# Patient Record
Sex: FEMALE | Race: WHITE | NOT HISPANIC OR LATINO | Employment: OTHER | URBAN - METROPOLITAN AREA
[De-identification: names, ages, dates, MRNs, and addresses within clinical notes are randomized per-mention and may not be internally consistent; named-entity substitution may affect disease eponyms.]

---

## 2017-01-16 ENCOUNTER — TELEPHONE (OUTPATIENT)
Dept: OBSTETRICS AND GYNECOLOGY | Facility: CLINIC | Age: 62
End: 2017-01-16

## 2017-01-16 DIAGNOSIS — Z12.31 VISIT FOR SCREENING MAMMOGRAM: Primary | ICD-10-CM

## 2017-01-16 NOTE — TELEPHONE ENCOUNTER
Called pt concerning follow-up imaging evaluation at [facility]. Instructed pt to call [facility] to schedule appt per Dr Zee. Pt voiced understanding.

## 2017-07-20 ENCOUNTER — TELEPHONE (OUTPATIENT)
Dept: OBSTETRICS AND GYNECOLOGY | Facility: CLINIC | Age: 62
End: 2017-07-20

## 2018-03-28 ENCOUNTER — TELEPHONE (OUTPATIENT)
Dept: OBSTETRICS AND GYNECOLOGY | Facility: CLINIC | Age: 63
End: 2018-03-28

## 2018-03-28 NOTE — TELEPHONE ENCOUNTER
----- Message from Nia Simeon sent at 3/28/2018 10:24 AM CDT -----  Contact: PT  x_  1st Request  _  2nd Request  _  3rd Request      Who:pt    Why: pt states that she is still waiting on a call back    What Number to Call Back: [phone]    When to Expect a call back: (Before the end of the day)   -- if call after 3:00 call back will be tomorrow.

## 2018-05-15 ENCOUNTER — OFFICE VISIT (OUTPATIENT)
Dept: OBSTETRICS AND GYNECOLOGY | Facility: CLINIC | Age: 63
End: 2018-05-15
Attending: OBSTETRICS & GYNECOLOGY
Payer: COMMERCIAL

## 2018-05-15 VITALS
SYSTOLIC BLOOD PRESSURE: 144 MMHG | WEIGHT: 177.5 LBS | BODY MASS INDEX: 27.86 KG/M2 | DIASTOLIC BLOOD PRESSURE: 98 MMHG | HEIGHT: 67 IN

## 2018-05-15 DIAGNOSIS — Z01.419 WELL WOMAN EXAM WITH ROUTINE GYNECOLOGICAL EXAM: Primary | ICD-10-CM

## 2018-05-15 DIAGNOSIS — N39.0 FREQUENT UTI: ICD-10-CM

## 2018-05-15 DIAGNOSIS — Z01.419 PAP SMEAR, AS PART OF ROUTINE GYNECOLOGICAL EXAMINATION: ICD-10-CM

## 2018-05-15 DIAGNOSIS — N95.2 VAGINAL ATROPHY: ICD-10-CM

## 2018-05-15 DIAGNOSIS — Z78.0 MENOPAUSE: ICD-10-CM

## 2018-05-15 PROCEDURE — 99396 PREV VISIT EST AGE 40-64: CPT | Mod: S$GLB,,, | Performed by: OBSTETRICS & GYNECOLOGY

## 2018-05-15 PROCEDURE — 88175 CYTOPATH C/V AUTO FLUID REDO: CPT

## 2018-05-15 NOTE — PROGRESS NOTES
Subjective:       Patient ID: Lucy Booth is a 62 y.o. female.    Chief Complaint:  Well Woman      History of Present Illness  HPI  This 62 yr old P3 female is here for routine exam.  She is new to me but saw Dr Zee in the past.  She is not taking ERT Last pap  and we have no pap on file.  Her only complaint is frequent UTIs.  She had some sort of sling/repair done with her hyst and she still had ovaries.  No leaking just frequent UTIs  We discussed atrophy and will start on intrarosa and see if this helps.  She is not sexually active.    GYN & OB History  No LMP recorded. Patient has had a hysterectomy.   Date of Last Pap: No result found    OB History    Para Term  AB Living   3 3       3   SAB TAB Ectopic Multiple Live Births           3      # Outcome Date GA Lbr Lit/2nd Weight Sex Delivery Anes PTL Lv   3 Para      Vag-Spont   STEVEN   2 Para      Vag-Spont   STEVEN   1 Para      Vag-Spont   STEVEN          Review of Systems  Review of Systems   Constitutional: Negative for chills and fever.   Respiratory: Negative for shortness of breath.    Cardiovascular: Negative for chest pain.   Gastrointestinal: Negative for abdominal pain, nausea and vomiting.   Genitourinary: Negative for difficulty urinating, dyspareunia, genital sores, menstrual problem, pelvic pain, vaginal bleeding, vaginal discharge and vaginal pain.   Skin: Negative for wound.   Hematological: Negative for adenopathy.           Objective:    Physical Exam:   Constitutional: She is oriented to person, place, and time. She appears well-developed and well-nourished.    HENT:   Head: Normocephalic.    Eyes: EOM are normal.    Neck: Normal range of motion.    Cardiovascular: Normal rate.     Pulmonary/Chest: Effort normal. She exhibits no mass and no tenderness. Right breast exhibits no inverted nipple, no mass, no skin change and no tenderness. Left breast exhibits no inverted nipple, no mass, no skin change and no tenderness.         Abdominal: Soft. She exhibits no distension. There is no tenderness.     Genitourinary: Vagina normal. There is no rash, tenderness or lesion on the right labia. There is no rash, tenderness or lesion on the left labia. Uterus is absent. Uterus is not tender. Cervix is normal. Right adnexum displays no mass, no tenderness and no fullness. Left adnexum displays no mass, no tenderness and no fullness. Cervix exhibits no discharge.   Genitourinary Comments: Vaginal atrophy           Musculoskeletal: Normal range of motion.       Neurological: She is alert and oriented to person, place, and time.    Skin: Skin is warm and dry.    Psychiatric: She has a normal mood and affect.          Assessment:        1. Well woman exam with routine gynecological exam    2. Menopause    3. Vaginal atrophy    4. Frequent UTI               Plan:      Start on intrarosa  Pap today and in 5 yrs  Mammogram yearly

## 2018-08-24 ENCOUNTER — TELEPHONE (OUTPATIENT)
Dept: OBSTETRICS AND GYNECOLOGY | Facility: CLINIC | Age: 63
End: 2018-08-24

## 2018-08-24 NOTE — TELEPHONE ENCOUNTER
----- Message from Yamil Hightower MA sent at 8/22/2018  1:28 PM CDT -----  Patient is requesting mmg orders be sent to [facility]......Please contact to further discuss and advise       Can the clinic reply by [facility]: No     What Number to Call Back if not in [facility]: [phone]

## 2019-06-25 ENCOUNTER — TELEPHONE (OUTPATIENT)
Dept: OBSTETRICS AND GYNECOLOGY | Facility: CLINIC | Age: 64
End: 2019-06-25

## 2019-06-25 NOTE — TELEPHONE ENCOUNTER
----- Message from Sofia Naylor sent at 6/25/2019 11:54 AM CDT -----  Contact: EVELIO BAUM [[id number]]  Name of Who is Calling: EVELIO BAUM [[id number]]      What is the request in detail: patient would like to schedule WWE. Please call to schedule     Can the clinic reply by [facility]: no    What Number to Call Back if not in [facility]: [phone]

## 2019-08-15 ENCOUNTER — OFFICE VISIT (OUTPATIENT)
Dept: OBSTETRICS AND GYNECOLOGY | Facility: CLINIC | Age: 64
End: 2019-08-15
Attending: OBSTETRICS & GYNECOLOGY
Payer: COMMERCIAL

## 2019-08-15 VITALS
HEIGHT: 67 IN | WEIGHT: 181.44 LBS | SYSTOLIC BLOOD PRESSURE: 124 MMHG | BODY MASS INDEX: 28.48 KG/M2 | DIASTOLIC BLOOD PRESSURE: 78 MMHG

## 2019-08-15 DIAGNOSIS — Z12.31 SCREENING MAMMOGRAM, ENCOUNTER FOR: ICD-10-CM

## 2019-08-15 DIAGNOSIS — Z01.419 WELL WOMAN EXAM WITH ROUTINE GYNECOLOGICAL EXAM: Primary | ICD-10-CM

## 2019-08-15 DIAGNOSIS — Z13.820 SCREENING FOR OSTEOPOROSIS: ICD-10-CM

## 2019-08-15 DIAGNOSIS — Z12.4 PAP SMEAR FOR CERVICAL CANCER SCREENING: ICD-10-CM

## 2019-08-15 PROCEDURE — 99396 PR PREVENTIVE VISIT,EST,40-64: ICD-10-PCS | Mod: S$GLB,,, | Performed by: OBSTETRICS & GYNECOLOGY

## 2019-08-15 PROCEDURE — 87624 HPV HI-RISK TYP POOLED RSLT: CPT

## 2019-08-15 PROCEDURE — 88175 CYTOPATH C/V AUTO FLUID REDO: CPT

## 2019-08-15 PROCEDURE — 99396 PREV VISIT EST AGE 40-64: CPT | Mod: S$GLB,,, | Performed by: OBSTETRICS & GYNECOLOGY

## 2019-08-15 NOTE — PROGRESS NOTES
Subjective:       Patient ID: Lucy Booth is a 64 y.o. female.    Chief Complaint:  Well Woman      History of Present Illness  HPI  This 64 yr old P3 female is here for routine exam and has no complaints.  She is not on HRT but we started intrarosa last yr for freq UTIs but she has stopped taking.  She is due for mammogram and has done [facility].  She had her hyst for abnormal paps so will get another on chart with HPV today before skipping.    GYN & OB History  No LMP recorded. Patient has had a hysterectomy.   Date of Last Pap: 2018    OB History    Para Term  AB Living   3 3       3   SAB TAB Ectopic Multiple Live Births           3      # Outcome Date GA Lbr Lit/2nd Weight Sex Delivery Anes PTL Lv   3 Para      Vag-Spont   STEVEN   2 Para      Vag-Spont   STEVEN   1 Para      Vag-Spont   STEVEN       Review of Systems  Review of Systems   Constitutional: Negative for chills and fever.   Respiratory: Negative for shortness of breath.    Cardiovascular: Negative for chest pain.   Gastrointestinal: Negative for abdominal pain, nausea and vomiting.   Genitourinary: Negative for difficulty urinating, dyspareunia, genital sores, menstrual problem, pelvic pain, vaginal bleeding, vaginal discharge and vaginal pain.   Skin: Negative for wound.   Hematological: Negative for adenopathy.           Objective:   Physical Exam:   Constitutional: She is oriented to person, place, and time. She appears well-developed and well-nourished.    HENT:   Head: Normocephalic.    Eyes: EOM are normal.    Neck: Normal range of motion.    Cardiovascular: Normal rate.     Pulmonary/Chest: Effort normal. She exhibits no mass and no tenderness. Right breast exhibits no inverted nipple, no mass, no skin change and no tenderness. Left breast exhibits no inverted nipple, no mass, no skin change and no tenderness.        Abdominal: Soft. She exhibits no distension. There is no tenderness.     Genitourinary: Vagina normal. There is  no rash, tenderness or lesion on the right labia. There is no rash, tenderness or lesion on the left labia. Uterus is absent. Uterus is not tender. Cervix is normal. Right adnexum displays no mass, no tenderness and no fullness. Left adnexum displays no mass, no tenderness and no fullness. Cervix exhibits no discharge.           Musculoskeletal: Normal range of motion.       Neurological: She is alert and oriented to person, place, and time.    Skin: Skin is warm and dry.    Psychiatric: She has a normal mood and affect.     She does not have a cervix     Assessment:        1. Well woman exam with routine gynecological exam    2. Screening mammogram, encounter for    3. Pap smear for cervical cancer screening    4. Screening for osteoporosis               Plan:      Mammogram yearly  DEXA scan  She may have refil on intrarosa if she wants  Pap with HPV today then move to several yrs.

## 2019-08-21 LAB
HPV HR 12 DNA CVX QL NAA+PROBE: NEGATIVE
HPV16 AG SPEC QL: NEGATIVE
HPV18 DNA SPEC QL NAA+PROBE: NEGATIVE

## 2019-10-07 ENCOUNTER — TELEPHONE (OUTPATIENT)
Dept: OBSTETRICS AND GYNECOLOGY | Facility: CLINIC | Age: 64
End: 2019-10-07

## 2019-10-07 NOTE — TELEPHONE ENCOUNTER
----- Message from Jose Carlos Rowell sent at 10/7/2019 12:29 PM CDT -----  Contact: EVELIO BAUM [[id number]]  Name of Who is Calling: EVELIO BAUM [[id number]]    What is the request in detail:patient is requesting a call back in regards to scheduling appointment ....     Please contact to further discuss and advise      Can the clinic reply by [facility]: NO     What Number to Call Back if not in [facility]:  [phone]

## 2019-10-16 ENCOUNTER — OFFICE VISIT (OUTPATIENT)
Dept: OBSTETRICS AND GYNECOLOGY | Facility: CLINIC | Age: 64
End: 2019-10-16
Payer: COMMERCIAL

## 2019-10-16 VITALS
SYSTOLIC BLOOD PRESSURE: 129 MMHG | DIASTOLIC BLOOD PRESSURE: 83 MMHG | HEIGHT: 67 IN | BODY MASS INDEX: 28.37 KG/M2 | WEIGHT: 180.75 LBS

## 2019-10-16 DIAGNOSIS — Z00.00 WELL WOMAN EXAM WITHOUT GYNECOLOGICAL EXAM: ICD-10-CM

## 2019-10-16 DIAGNOSIS — R10.2 PELVIC PAIN IN FEMALE: Primary | ICD-10-CM

## 2019-10-16 DIAGNOSIS — N39.0 URINARY TRACT INFECTION WITHOUT HEMATURIA, SITE UNSPECIFIED: ICD-10-CM

## 2019-10-16 DIAGNOSIS — N95.2 VAGINAL ATROPHY: ICD-10-CM

## 2019-10-16 PROCEDURE — 99214 OFFICE O/P EST MOD 30 MIN: CPT | Mod: S$GLB,,, | Performed by: OBSTETRICS & GYNECOLOGY

## 2019-10-16 PROCEDURE — 99214 PR OFFICE/OUTPT VISIT, EST, LEVL IV, 30-39 MIN: ICD-10-PCS | Mod: S$GLB,,, | Performed by: OBSTETRICS & GYNECOLOGY

## 2019-10-16 PROCEDURE — 3008F BODY MASS INDEX DOCD: CPT | Mod: CPTII,S$GLB,, | Performed by: OBSTETRICS & GYNECOLOGY

## 2019-10-16 PROCEDURE — 3008F PR BODY MASS INDEX (BMI) DOCUMENTED: ICD-10-PCS | Mod: CPTII,S$GLB,, | Performed by: OBSTETRICS & GYNECOLOGY

## 2019-10-16 PROCEDURE — 99999 PR PBB SHADOW E&M-EST. PATIENT-LVL III: ICD-10-PCS | Mod: PBBFAC,,, | Performed by: OBSTETRICS & GYNECOLOGY

## 2019-10-16 PROCEDURE — 99999 PR PBB SHADOW E&M-EST. PATIENT-LVL III: CPT | Mod: PBBFAC,,, | Performed by: OBSTETRICS & GYNECOLOGY

## 2019-10-16 RX ORDER — ESTRADIOL 0.1 MG/G
1 CREAM VAGINAL DAILY
Qty: 42.5 G | Refills: 1 | Status: SHIPPED | OUTPATIENT
Start: 2019-10-16 | End: 2019-12-06

## 2019-10-16 NOTE — PROGRESS NOTES
Subjective:       Patient ID: Lucy Booth is a 64 y.o. female.    Chief Complaint:  Pelvic Pain      History of Present Illness  HPI  This 64 yr old para 3 female is here for pelvic pain that started in Sept around the  in [place].  She is having twinges low in the pelvis on both sides. Dull in nature and today on right side. Then she started having midline lower back pain.  She had normal pap in  with normal exam.  She has had bloatedness. With this and worried about ovarian ca.  Friend  of ovarian ca.    GYN & OB History  No LMP recorded. Patient has had a hysterectomy.   Date of Last Pap: 2019    OB History    Para Term  AB Living   3 3       3   SAB TAB Ectopic Multiple Live Births           3      # Outcome Date GA Lbr Lit/2nd Weight Sex Delivery Anes PTL Lv   3 Para      Vag-Spont   STEVEN   2 Para      Vag-Spont   STEVEN   1 Para      Vag-Spont   STEVEN       Review of Systems  Review of Systems   Constitutional: Negative for chills and fever.   Respiratory: Negative for shortness of breath.    Cardiovascular: Negative for chest pain.   Gastrointestinal: Negative for abdominal pain, nausea and vomiting.   Genitourinary: Positive for dysuria and pelvic pain. Negative for difficulty urinating, dyspareunia, genital sores, menstrual problem, vaginal bleeding, vaginal discharge and vaginal pain.   Skin: Negative for wound.   Hematological: Negative for adenopathy.           Objective:   Physical Exam:   Constitutional: She is oriented to person, place, and time. She appears well-developed and well-nourished.    HENT:   Head: Normocephalic.    Eyes: EOM are normal.    Neck: Normal range of motion.    Cardiovascular: Normal rate.     Pulmonary/Chest: Effort normal. She exhibits no mass and no tenderness. Right breast exhibits no inverted nipple, no mass, no skin change and no tenderness. Left breast exhibits no inverted nipple, no mass, no skin change and no tenderness.        Abdominal:  Soft. She exhibits no distension. There is no tenderness.     Genitourinary: Vagina normal. There is no rash, tenderness or lesion on the right labia. There is no rash, tenderness or lesion on the left labia. Uterus is absent. Uterus is not tender. Cervix is normal. Right adnexum displays no mass, no tenderness and no fullness. Left adnexum displays no mass, no tenderness and no fullness. Cervix exhibits no discharge.   Genitourinary Comments: Good exam and she does not have a cervix.  No masses.           Musculoskeletal: Normal range of motion.       Neurological: She is alert and oriented to person, place, and time.    Skin: Skin is warm and dry.    Psychiatric: She has a normal mood and affect.          Assessment:        1. Well woman exam with routine gynecological exam    2. Pelvic pain in female    3. Urinary tract infection without hematuria, site unspecified    4. Vaginal atrophy               Plan:      Will get ultrasound to eval ovaries  Start on estrace vaginal cream to help with atrophy and possible source of frequent UTIs  May change to imvexxy in month or so  Urine culture  Refer to internal med Dr Brunner

## 2019-10-18 ENCOUNTER — HOSPITAL ENCOUNTER (OUTPATIENT)
Dept: RADIOLOGY | Facility: OTHER | Age: 64
Discharge: HOME | End: 2019-10-18
Attending: OBSTETRICS & GYNECOLOGY
Payer: COMMERCIAL

## 2019-10-18 DIAGNOSIS — R10.2 PELVIC PAIN IN FEMALE: ICD-10-CM

## 2019-10-18 PROCEDURE — 76830 TRANSVAGINAL US NON-OB: CPT | Mod: 26,,, | Performed by: RADIOLOGY

## 2019-10-18 PROCEDURE — 76856 US PELVIS COMP WITH TRANSVAG NON-OB (XPD): ICD-10-PCS | Mod: 26,,, | Performed by: RADIOLOGY

## 2019-10-18 PROCEDURE — 76830 US PELVIS COMP WITH TRANSVAG NON-OB (XPD): ICD-10-PCS | Mod: 26,,, | Performed by: RADIOLOGY

## 2019-10-18 PROCEDURE — 76856 US EXAM PELVIC COMPLETE: CPT | Mod: 26,,, | Performed by: RADIOLOGY

## 2019-10-18 PROCEDURE — 76830 TRANSVAGINAL US NON-OB: CPT | Mod: TC

## 2019-10-22 ENCOUNTER — PATIENT MESSAGE (OUTPATIENT)
Dept: OBSTETRICS AND GYNECOLOGY | Facility: CLINIC | Age: 64
End: 2019-10-22

## 2019-12-06 ENCOUNTER — OFFICE VISIT (OUTPATIENT)
Dept: URGENT CARE | Facility: CLINIC | Age: 64
End: 2019-12-06
Payer: COMMERCIAL

## 2019-12-06 VITALS
HEART RATE: 82 BPM | OXYGEN SATURATION: 97 % | RESPIRATION RATE: 16 BRPM | DIASTOLIC BLOOD PRESSURE: 84 MMHG | TEMPERATURE: 101 F | BODY MASS INDEX: 28.19 KG/M2 | SYSTOLIC BLOOD PRESSURE: 125 MMHG | WEIGHT: 180 LBS

## 2019-12-06 DIAGNOSIS — R68.89 FLU-LIKE SYMPTOMS: Primary | ICD-10-CM

## 2019-12-06 DIAGNOSIS — R50.9 FEVER, UNSPECIFIED FEVER CAUSE: ICD-10-CM

## 2019-12-06 LAB
CTP QC/QA: YES
FLUAV AG NPH QL: NEGATIVE
FLUBV AG NPH QL: NEGATIVE

## 2019-12-06 PROCEDURE — 99214 PR OFFICE/OUTPT VISIT, EST, LEVL IV, 30-39 MIN: ICD-10-PCS | Mod: S$GLB,,, | Performed by: FAMILY MEDICINE

## 2019-12-06 PROCEDURE — 99214 OFFICE O/P EST MOD 30 MIN: CPT | Mod: S$GLB,,, | Performed by: FAMILY MEDICINE

## 2019-12-06 PROCEDURE — 87804 POCT INFLUENZA A/B: ICD-10-PCS | Mod: QW,S$GLB,, | Performed by: FAMILY MEDICINE

## 2019-12-06 PROCEDURE — 87804 INFLUENZA ASSAY W/OPTIC: CPT | Mod: QW,S$GLB,, | Performed by: FAMILY MEDICINE

## 2019-12-06 RX ORDER — OSELTAMIVIR PHOSPHATE 75 MG/1
75 CAPSULE ORAL 2 TIMES DAILY
Qty: 10 CAPSULE | Refills: 0 | Status: SHIPPED | OUTPATIENT
Start: 2019-12-06 | End: 2019-12-11

## 2019-12-06 NOTE — PATIENT INSTRUCTIONS
Influenza (Adult)    Influenza is also called the flu. It is a viral illness that affects the air passages of your lungs. It is different from the common cold. The flu can easily be passed from one to person to another. It may be spread through the air by coughing and sneezing. Or it can be spread by touching the sick person and then touching your own eyes, nose, or mouth.  The flu starts 1 to 3 days after you are exposed to the flu virus. It may last for 1 to 2 weeks but many people feel tired or fatigued for many weeks afterward. You usually dont need to take antibiotics unless you have a complication. This might be an ear or sinus infection or pneumonia.  Symptoms of the flu may be mild or severe. They can include extreme tiredness (wanting to stay in bed all day), chills, fevers, muscle aches, soreness with eye movement, headache, and a dry, hacking cough.  Home care  Follow these guidelines when caring for yourself at home:  · Avoid being around cigarette smoke, whether yours or other peoples.  · Acetaminophen or ibuprofen will help ease your fever, muscle aches, and headache. Dont give aspirin to anyone younger than 18 who has the flu. Aspirin can harm the liver.  · Nausea and loss of appetite are common with the flu. Eat light meals. Drink 6 to 8 glasses of liquids every day. Good choices are water, sport drinks, soft drinks without caffeine, juices, tea, and soup. Extra fluids will also help loosen secretions in your nose and lungs.  · Over-the-counter cold medicines will not make the flu go away faster. But the medicines may help with coughing, sore throat, and congestion in your nose and sinuses. Dont use a decongestant if you have high blood pressure.  · Stay home until your fever has been gone for at least 24 hours without using medicine to reduce fever.  Follow-up care  Follow up with your healthcare provider, or as advised, if you are not getting better over the next week.  If you are age 65 or  older, talk with your provider about getting a pneumococcal vaccine every 5 years. You should also get this vaccine if you have chronic asthma or COPD. All adults should get a flu vaccine every fall. Ask your provider about this.  When to seek medical advice  Call your healthcare provider right away if any of these occur:  · Cough with lots of colored mucus (sputum) or blood in your mucus  · Chest pain, shortness of breath, wheezing, or trouble breathing  · Severe headache, or face, neck, or ear pain  · New rash with fever  · Fever of 100.4°F (38°C) or higher, or as directed by your healthcare provider  · Confusion, behavior change, or seizure  · Severe weakness or dizziness  · You get a new fever or cough after getting better for a few days  Date Last Reviewed: 1/1/2017  © 9199-1828 [employer]. [address] [place] [address]. All rights reserved. This information is not intended as a substitute for professional medical care. Always follow your healthcare professional's instructions.      Symptomatic treatment:    Alternate Tylenol and Ibuprofen every 3 hrs  salt water gargles to soothe throat  Honey/lemon water to soothe throat  Cepachol helps to numb the discomfort in throat  Nasal saline spray reduces inflammation and dryness  Warm face compresses/hot showers as often as you can to open sinuses   Flonase OTC or Nasacort OTC  Simple foods like chicken noodle soup help hydrate  Delsym helps with coughing at night or mucinex DM(not D)  Zyrtec/Claritin during the day and Benadryl at night may help if allergy component   Zantac will help if there is reflux from the post nasal drip  Rest as much as you can  Your symptoms will likely last 5-7 days, maybe longer depending on how it affects your body.    You may be contagious so minimize contact with others to reduce the spread to others and stay home from work or school if necessary as we discussed. Dehydration is preventable but is one of the  main reasons why you will feel so badly. Drink pedialyte, gatorade or propel. Stay hydrated.    Antibiotics are not needed unless a complication(such as Otitis Media, Bacterial sinus infection or pneumonia). Taking antibiotics for Flu/Cold is not supported by evidencebased medicine and can expose you to unnecessary side effects of the medication, such as anaphylaxis.   If you experience any:  Chest pain, shortness of breath, wheezing or difficulty breathing  Severe headache, face, neck or ear pain  New rash  Fever over 101.5º F (38.6 C) for more than three days  Confusion, behavior change or seizure  Severe weakness or dizziness  Go to ER

## 2019-12-06 NOTE — PROGRESS NOTES
Subjective:       Patient ID: Lucy Booth is a 64 y.o. female.    Vitals:  weight is 81.6 kg (180 lb). Her temperature is 100.6 °F (38.1 °C) (abnormal). Her blood pressure is 125/84 and her pulse is 82. Her respiration is 16 and oxygen saturation is 97%.     Chief Complaint: Fever and Cough    Lucy Booth is a 64 y.o. female who presents to clinic c/o fever for the past five days. Associated sx include sore throat, cough, and sinus congestion. She states that she started feeling better earlier this week and then sx worsened two days ago. She describes cough as non-productive and intermittent in nature. Pt denies dyspnea, abdominal pain, or N/V/D. Pt reports taking Ibuprofen for fever with mild alleviation of sx. She reports high fever of 101.0. Denies sick contacts or recent travel.      Fever    This is a new problem. The current episode started in the past 7 days. The problem occurs constantly. The problem has been gradually worsening. The maximum temperature noted was 101 to 101.9 F. The temperature was taken using an oral thermometer. Associated symptoms include congestion, coughing and a sore throat. Pertinent negatives include no ear pain, nausea, rash, vomiting or wheezing. She has tried NSAIDs for the symptoms. The treatment provided mild relief.   Risk factors: no recent travel and no sick contacts    Cough   This is a new problem. The current episode started in the past 7 days. The problem has been gradually worsening. The problem occurs constantly. The cough is non-productive. Associated symptoms include chills, a fever and a sore throat. Pertinent negatives include no ear pain, eye redness, hemoptysis, myalgias, rash, shortness of breath or wheezing.       Constitution: Positive for chills, sweating, fatigue and fever.   HENT: Positive for congestion and sore throat. Negative for ear pain, sinus pain, sinus pressure and voice change.    Neck: Negative for painful lymph nodes.   Eyes: Negative  for eye redness.   Respiratory: Positive for cough. Negative for chest tightness, sputum production, bloody sputum, COPD, shortness of breath, stridor, wheezing and asthma.    Gastrointestinal: Negative for nausea and vomiting.   Musculoskeletal: Negative for muscle ache.   Skin: Negative for rash.   Allergic/Immunologic: Negative for seasonal allergies and asthma.   Hematologic/Lymphatic: Negative for swollen lymph nodes.       Objective:      Physical Exam   Constitutional: She is oriented to person, place, and time. She appears well-developed and well-nourished. She is cooperative.  Non-toxic appearance. She does not have a sickly appearance. She does not appear ill. No distress.   HENT:   Head: Normocephalic and atraumatic.   Right Ear: Hearing, tympanic membrane, external ear and ear canal normal.   Left Ear: Hearing, tympanic membrane, external ear and ear canal normal.   Nose: No mucosal edema, rhinorrhea or nasal deformity. No epistaxis. Right sinus exhibits no maxillary sinus tenderness and no frontal sinus tenderness. Left sinus exhibits no maxillary sinus tenderness and no frontal sinus tenderness.   Mouth/Throat: Uvula is midline and mucous membranes are normal. No trismus in the jaw. Normal dentition. No uvula swelling. No oropharyngeal exudate, posterior oropharyngeal edema or posterior oropharyngeal erythema.   Clear rhinorrhea  And mild red throat   Eyes: Conjunctivae and lids are normal. No scleral icterus.   Neck: Trachea normal, full passive range of motion without pain and phonation normal. Neck supple. No neck rigidity. No edema and no erythema present.   Cardiovascular: Normal rate, regular rhythm, normal heart sounds, intact distal pulses and normal pulses.   Pulmonary/Chest: Effort normal. No respiratory distress. She has no decreased breath sounds. She has no rhonchi.   Course loose rhonchi   Abdominal: Normal appearance.   Musculoskeletal: Normal range of motion. She exhibits no edema or  deformity.   Neurological: She is alert and oriented to person, place, and time. She exhibits normal muscle tone. Coordination normal.   Skin: Skin is warm, dry, intact, not diaphoretic and not pale.   Psychiatric: She has a normal mood and affect. Her speech is normal and behavior is normal. Judgment and thought content normal. Cognition and memory are normal.   Nursing note and vitals reviewed.        Assessment:       1. Flu-like symptoms    2. Fever, unspecified fever cause        Plan:         Flu-like symptoms  -     oseltamivir (TAMIFLU) 75 MG capsule; Take 1 capsule (75 mg total) by mouth 2 (two) times daily. for 10 doses  Dispense: 10 capsule; Refill: 0    Fever, unspecified fever cause  -     POCT Influenza A/B      Patient Instructions     Influenza (Adult)    Influenza is also called the flu. It is a viral illness that affects the air passages of your lungs. It is different from the common cold. The flu can easily be passed from one to person to another. It may be spread through the air by coughing and sneezing. Or it can be spread by touching the sick person and then touching your own eyes, nose, or mouth.  The flu starts 1 to 3 days after you are exposed to the flu virus. It may last for 1 to 2 weeks but many people feel tired or fatigued for many weeks afterward. You usually dont need to take antibiotics unless you have a complication. This might be an ear or sinus infection or pneumonia.  Symptoms of the flu may be mild or severe. They can include extreme tiredness (wanting to stay in bed all day), chills, fevers, muscle aches, soreness with eye movement, headache, and a dry, hacking cough.  Home care  Follow these guidelines when caring for yourself at home:  · Avoid being around cigarette smoke, whether yours or other peoples.  · Acetaminophen or ibuprofen will help ease your fever, muscle aches, and headache. Dont give aspirin to anyone younger than 18 who has the flu. Aspirin can harm the  liver.  · Nausea and loss of appetite are common with the flu. Eat light meals. Drink 6 to 8 glasses of liquids every day. Good choices are water, sport drinks, soft drinks without caffeine, juices, tea, and soup. Extra fluids will also help loosen secretions in your nose and lungs.  · Over-the-counter cold medicines will not make the flu go away faster. But the medicines may help with coughing, sore throat, and congestion in your nose and sinuses. Dont use a decongestant if you have high blood pressure.  · Stay home until your fever has been gone for at least 24 hours without using medicine to reduce fever.  Follow-up care  Follow up with your healthcare provider, or as advised, if you are not getting better over the next week.  If you are age 65 or older, talk with your provider about getting a pneumococcal vaccine every 5 years. You should also get this vaccine if you have chronic asthma or COPD. All adults should get a flu vaccine every fall. Ask your provider about this.  When to seek medical advice  Call your healthcare provider right away if any of these occur:  · Cough with lots of colored mucus (sputum) or blood in your mucus  · Chest pain, shortness of breath, wheezing, or trouble breathing  · Severe headache, or face, neck, or ear pain  · New rash with fever  · Fever of 100.4°F (38°C) or higher, or as directed by your healthcare provider  · Confusion, behavior change, or seizure  · Severe weakness or dizziness  · You get a new fever or cough after getting better for a few days  Date Last Reviewed: 1/1/2017  © 4611-9347 [employer] [address] [place] [address], [place] [address]. All rights reserved. This information is not intended as a substitute for professional medical care. Always follow your healthcare professional's instructions.      Symptomatic treatment:    Alternate Tylenol and Ibuprofen every 3 hrs  salt water gargles to soothe throat  Honey/lemon water to soothe throat  Cepachol  helps to numb the discomfort in throat  Nasal saline spray reduces inflammation and dryness  Warm face compresses/hot showers as often as you can to open sinuses   Flonase OTC or Nasacort OTC  Simple foods like chicken noodle soup help hydrate  Delsym helps with coughing at night or mucinex DM(not D)  Zyrtec/Claritin during the day and Benadryl at night may help if allergy component   Zantac will help if there is reflux from the post nasal drip  Rest as much as you can  Your symptoms will likely last 5-7 days, maybe longer depending on how it affects your body.    You may be contagious so minimize contact with others to reduce the spread to others and stay home from work or school if necessary as we discussed. Dehydration is preventable but is one of the main reasons why you will feel so badly. Drink pedialyte, gatorade or propel. Stay hydrated.    Antibiotics are not needed unless a complication(such as Otitis Media, Bacterial sinus infection or pneumonia). Taking antibiotics for Flu/Cold is not supported by evidencebased medicine and can expose you to unnecessary side effects of the medication, such as anaphylaxis.   If you experience any:  Chest pain, shortness of breath, wheezing or difficulty breathing  Severe headache, face, neck or ear pain  New rash  Fever over 101.5º F (38.6 C) for more than three days  Confusion, behavior change or seizure  Severe weakness or dizziness  Go to ER

## 2020-06-25 ENCOUNTER — TELEPHONE (OUTPATIENT)
Dept: OBSTETRICS AND GYNECOLOGY | Facility: CLINIC | Age: 65
End: 2020-06-25

## 2020-06-25 NOTE — TELEPHONE ENCOUNTER
----- Message from Kaz Zazueta sent at 6/25/2020  9:31 AM CDT -----  Former Dr. Roth Patient would like to schedule an appt. Annual visit . Patient contact number [phone].     
Pt called to schedule annual after 10/16. Pt scheduled on 10/19  
Normal

## 2020-10-12 ENCOUNTER — TELEPHONE (OUTPATIENT)
Dept: OBSTETRICS AND GYNECOLOGY | Facility: CLINIC | Age: 65
End: 2020-10-12

## 2020-10-12 NOTE — TELEPHONE ENCOUNTER
----- Message from Jocelynn Ragland sent at 10/12/2020  4:45 PM CDT -----  Name of Who is Calling: EVELIO BAUM What is the request in detail: patient is calling to reschedule her appointment that is schedule for 10/19/2020. Please advise Can the clinic reply by [facility]: No What Number to Call Back if not in [facility]: [phone]

## 2020-10-12 NOTE — TELEPHONE ENCOUNTER
Pt called to reschedule annual from 10/19 she will be out of town. Advised pt next available is in January. Pt reschedule with [facility]ECT[facility] Hebert

## 2020-10-16 ENCOUNTER — OFFICE VISIT (OUTPATIENT)
Dept: OBSTETRICS AND GYNECOLOGY | Facility: CLINIC | Age: 65
End: 2020-10-16
Attending: OBSTETRICS & GYNECOLOGY
Payer: COMMERCIAL

## 2020-10-16 VITALS
SYSTOLIC BLOOD PRESSURE: 116 MMHG | WEIGHT: 181.44 LBS | BODY MASS INDEX: 28.42 KG/M2 | DIASTOLIC BLOOD PRESSURE: 80 MMHG

## 2020-10-16 DIAGNOSIS — Z01.419 ROUTINE GYNECOLOGICAL EXAMINATION: Primary | ICD-10-CM

## 2020-10-16 DIAGNOSIS — Z12.39 ENCOUNTER FOR SCREENING FOR MALIGNANT NEOPLASM OF BREAST, UNSPECIFIED SCREENING MODALITY: ICD-10-CM

## 2020-10-16 DIAGNOSIS — M85.80 OSTEOPENIA, UNSPECIFIED LOCATION: ICD-10-CM

## 2020-10-16 PROCEDURE — 99396 PREV VISIT EST AGE 40-64: CPT | Mod: S$GLB,,, | Performed by: PHYSICIAN ASSISTANT

## 2020-10-16 PROCEDURE — 3008F PR BODY MASS INDEX (BMI) DOCUMENTED: ICD-10-PCS | Mod: CPTII,S$GLB,, | Performed by: PHYSICIAN ASSISTANT

## 2020-10-16 PROCEDURE — 99396 PR PREVENTIVE VISIT,EST,40-64: ICD-10-PCS | Mod: S$GLB,,, | Performed by: PHYSICIAN ASSISTANT

## 2020-10-16 PROCEDURE — 3008F BODY MASS INDEX DOCD: CPT | Mod: CPTII,S$GLB,, | Performed by: PHYSICIAN ASSISTANT

## 2020-10-16 NOTE — PROGRESS NOTES
CC: Well woman exam    Lucy Booth is a 65 y.o. female  presents for well woman exam.  LMP: No LMP recorded. Patient has had a hysterectomy. Still has ovaries and worries about ovarian cancer. Gets mammograms yearly at [facility]. Just established PCP at [facility] as well. Saw cardiology for concerns of A fib but work up negative. Had routine screening labs with cardiology that were all normal. No complaints today.    Mammogram: 2019 At [facility]  Pap: 8/15/2019- with HPV negative  PCP: At [facility]  Routine Screening Labs: With cardiology this year,   DEXA: 2019 at [facility]- Osteopenia    Past Medical History:   Diagnosis Date    Abnormal Pap smear     Fibrocystic breast      Past Surgical History:   Procedure Laterality Date    BLADDER SUSPENSION      LAPAROSCOPIC HYSTERECTOMY      AUB     Social History     Socioeconomic History    Marital status:      Spouse name: Not on file    Number of children: Not on file    Years of education: Not on file    Highest education level: Not on file   Occupational History    Not on file   Social Needs    Financial resource strain: Not on file    Food insecurity     Worry: Not on file     Inability: Not on file    Transportation needs     Medical: Not on file     Non-medical: Not on file   Tobacco Use    Smoking status: Never Smoker   Substance and Sexual Activity    Alcohol use: Yes     Comment: Socially    Drug use: No    Sexual activity: Not Currently     Birth control/protection: Surgical     Comment:    Lifestyle    Physical activity     Days per week: Not on file     Minutes per session: Not on file    Stress: Not on file   Relationships    Social connections     Talks on phone: Not on file     Gets together: Not on file     Attends Gnosticism service: Not on file     Active member of club or organization: Not on file     Attends meetings of clubs or organizations: Not on file     Relationship status: Not on file   Other Topics Concern     Not on file   Social History Narrative    Not on file     Family History   Problem Relation Age of Onset    Breast cancer Paternal Grandmother     Colon cancer Neg Hx     Ovarian cancer Neg Hx      OB History        3    Para   3    Term                AB        Living   3       SAB        TAB        Ectopic        Multiple        Live Births   3               No current outpatient medications on file.    The ASCVD Risk score (Tha ACOSTA Jr., et al., 2013) failed to calculate for the following reasons:    Cannot find a previous HDL lab    Cannot find a previous total cholesterol lab    /80   Wt 82.3 kg (181 lb 7 oz)   BMI 28.42 kg/m²       ROS:  GENERAL: Denies weight gain or weight loss. Feeling well overall.   SKIN: Denies rash or lesions.   HEAD: Denies head injury or headache.   NODES: Denies enlarged lymph nodes.   CHEST: Denies chest pain or shortness of breath.   CARDIOVASCULAR: Denies palpitations or left sided chest pain.   ABDOMEN: No abdominal pain, constipation, diarrhea, nausea, vomiting or rectal bleeding.   URINARY: No frequency, dysuria, hematuria, or burning on urination.  REPRODUCTIVE: See HPI.   BREASTS: Denies pain, lumps, or nipple discharge.   HEMATOLOGIC: No easy bruisability or excessive bleeding.   MUSCULOSKELETAL: Denies joint pain or swelling.   NEUROLOGIC: Denies syncope or weakness.   PSYCHIATRIC: Denies depression, anxiety or mood swings.    PHYSICAL EXAM:  APPEARANCE: Well nourished, well developed, in no acute distress.  AFFECT: WNL, alert and oriented x 3  SKIN: No acne or hirsutism  NECK: Neck symmetric without masses or thyromegaly  NODES: No inguinal, cervical, axillary, or femoral lymph node enlargement  CHEST: Good respiratory effect  ABDOMEN: Soft.  No tenderness or masses.  No hepatosplenomegaly.  No hernias.  BREASTS: Symmetrical, no skin changes or visible lesions.  No palpable masses, nipple discharge bilaterally.  PELVIC: Normal external genitalia  without lesions.  Normal hair distribution.  Adequate perineal body, normal urethral meatus.  Vagina moist and well rugated without lesions or discharge. Cervix and Uterus are surgically absent.  Adnexa without masses or tenderness.    EXTREMITIES: No edema.    ASSESSMENT:   Routine gynecological examination: WWE today. Dr. Roth did a pap last year that was normal HPV negative. Will repeat pap in 2 years, then likely does not need a pap again. Mammogram at [facility]. Routine Screening labs done this year with Cardiology and has established with PCP.    Encounter for screening for malignant neoplasm of breast, unspecified screening modality  -     Mammo Digital Screening Bilat w/ Mati; Future; Expected date: 10/16/2020    Osteopenia, unspecified location: Reviewed calcium and vit d intake.           PLAN:   Order for mammogram, will fax to [facility].  Advised Vitamin D3 2000u QD and Calcium preferred through diet. Calcium 600mg in multivitamin.  Encouraged light weight exercises.  Follow up yearly for WWE or sooner PRN.    Patient was counseled today on A.C.S. Pap guidelines and recommendations for yearly pelvic exams, mammograms and monthly self breast exams; to see her PCP for other health maintenance.

## 2020-11-06 ENCOUNTER — OFFICE VISIT (OUTPATIENT)
Dept: URGENT CARE | Facility: CLINIC | Age: 65
End: 2020-11-06
Payer: COMMERCIAL

## 2020-11-06 VITALS
HEIGHT: 68 IN | TEMPERATURE: 99 F | SYSTOLIC BLOOD PRESSURE: 143 MMHG | OXYGEN SATURATION: 98 % | DIASTOLIC BLOOD PRESSURE: 86 MMHG | BODY MASS INDEX: 25.76 KG/M2 | WEIGHT: 170 LBS | RESPIRATION RATE: 18 BRPM | HEART RATE: 70 BPM

## 2020-11-06 DIAGNOSIS — U07.1 COVID-19 VIRUS DETECTED: ICD-10-CM

## 2020-11-06 DIAGNOSIS — Z11.9 SCREENING EXAMINATION FOR UNSPECIFIED INFECTIOUS DISEASE: ICD-10-CM

## 2020-11-06 DIAGNOSIS — U07.1 COVID-19 VIRUS INFECTION: Primary | ICD-10-CM

## 2020-11-06 DIAGNOSIS — R05.8 DRY COUGH: ICD-10-CM

## 2020-11-06 LAB
CTP QC/QA: YES
SARS-COV-2 RDRP RESP QL NAA+PROBE: POSITIVE

## 2020-11-06 PROCEDURE — 99214 OFFICE O/P EST MOD 30 MIN: CPT | Mod: S$GLB,,, | Performed by: PHYSICIAN ASSISTANT

## 2020-11-06 PROCEDURE — U0002: ICD-10-PCS | Mod: QW,S$GLB,, | Performed by: PHYSICIAN ASSISTANT

## 2020-11-06 PROCEDURE — U0002 COVID-19 LAB TEST NON-CDC: HCPCS | Mod: QW,S$GLB,, | Performed by: PHYSICIAN ASSISTANT

## 2020-11-06 PROCEDURE — 99214 PR OFFICE/OUTPT VISIT, EST, LEVL IV, 30-39 MIN: ICD-10-PCS | Mod: S$GLB,,, | Performed by: PHYSICIAN ASSISTANT

## 2020-11-06 RX ORDER — BENZONATATE 100 MG/1
100 CAPSULE ORAL 3 TIMES DAILY PRN
Qty: 30 CAPSULE | Refills: 0 | Status: SHIPPED | OUTPATIENT
Start: 2020-11-06 | End: 2020-11-16

## 2020-11-06 RX ORDER — PROMETHAZINE HYDROCHLORIDE AND DEXTROMETHORPHAN HYDROBROMIDE 6.25; 15 MG/5ML; MG/5ML
5 SYRUP ORAL NIGHTLY PRN
Qty: 118 ML | Refills: 0 | Status: SHIPPED | OUTPATIENT
Start: 2020-11-06 | End: 2020-11-16

## 2020-11-06 NOTE — PROGRESS NOTES
"Subjective:       Patient ID: Lucy Booth is a 65 y.o. female.    Vitals:  height is 5' 8" (1.727 m) and weight is 77.1 kg (170 lb). Her temperature is 99.1 °F (37.3 °C). Her blood pressure is 143/86 (abnormal) and her pulse is 70. Her respiration is 18 and oxygen saturation is 98%.     Chief Complaint: Cough    65 year old female presents urgent care clinic for evaluation.  Patient complaining of a dry cough and generalized body aches that started on Wednesday (2 days ago). Patient did not take any meds. Patient states that multiple of her family members are positive for covid and would like to get tested.  No other associated symptoms.      Constitution: Negative for activity change, appetite change, chills, sweating, fatigue, fever and generalized weakness.   HENT: Negative for ear pain, hearing loss, facial swelling, congestion, postnasal drip, sinus pain, sinus pressure, sore throat, trouble swallowing and voice change.    Neck: Negative for neck pain, neck stiffness and painful lymph nodes.   Cardiovascular: Negative for chest pain, leg swelling, palpitations, sob on exertion and passing out.   Eyes: Negative for eye discharge, eye pain, eye redness, photophobia, vision loss, double vision and blurred vision.   Respiratory: Positive for cough. Negative for chest tightness, sputum production, bloody sputum, COPD, shortness of breath, stridor, wheezing and asthma.    Gastrointestinal: Negative for abdominal pain, nausea, vomiting, constipation, diarrhea, bright red blood in stool, rectal bleeding, heartburn and bowel incontinence.   Genitourinary: Negative for dysuria, frequency, urgency, urine decreased, flank pain, bladder incontinence and hematuria.   Musculoskeletal: Positive for muscle ache. Negative for trauma, joint pain, joint swelling, abnormal ROM of joint and muscle cramps.   Skin: Negative for color change, pale, rash and wound.   Allergic/Immunologic: Negative for seasonal allergies, asthma and " immunocompromised state.   Neurological: Negative for dizziness, history of vertigo, light-headedness, passing out, facial drooping, speech difficulty, coordination disturbances, loss of balance, headaches, disorientation, altered mental status, loss of consciousness, numbness, tingling and seizures.   Hematologic/Lymphatic: Negative for swollen lymph nodes, easy bruising/bleeding and trouble clotting. Does not bruise/bleed easily.   Psychiatric/Behavioral: Negative for altered mental status and disorientation.       Objective:      Physical Exam   Constitutional: She is oriented to person, place, and time. She appears well-developed. She is cooperative.  Non-toxic appearance. She does not appear ill. No distress.   HENT:   Head: Normocephalic and atraumatic.   Ears:   Right Ear: Hearing, external ear and ear canal normal. No drainage, swelling or tenderness.   Left Ear: Hearing, external ear and ear canal normal. No drainage, swelling or tenderness.   Nose: Nose normal. No rhinorrhea, purulent discharge or congestion. Right sinus exhibits no maxillary sinus tenderness and no frontal sinus tenderness. Left sinus exhibits no maxillary sinus tenderness and no frontal sinus tenderness.   Mouth/Throat: Uvula is midline, oropharynx is clear and moist and mucous membranes are normal. No oral lesions. No trismus in the jaw. No uvula swelling. No posterior oropharyngeal edema. No tonsillar exudate.   Eyes: Pupils are equal, round, and reactive to light. Conjunctivae, EOM and lids are normal. Right eye exhibits no discharge. Left eye exhibits no discharge. No visual field deficit is present. Right conjunctiva is not injected. Right conjunctiva has no hemorrhage. Left conjunctiva is not injected. Left conjunctiva has no hemorrhage. extraocular movement intactvision grossly intactgaze aligned appropriately  Neck: Normal range of motion and full passive range of motion without pain. Neck supple. No neck rigidity.    Cardiovascular: Normal rate, regular rhythm, normal heart sounds and normal pulses.   No murmur heard.  Pulmonary/Chest: Effort normal and breath sounds normal. No accessory muscle usage or stridor. No respiratory distress. She has no wheezes. She exhibits no tenderness.   Abdominal: Soft. Normal appearance. She exhibits no distension and no mass. There is no splenomegaly or hepatomegaly. There is no abdominal tenderness. There is no rebound, no guarding, no tenderness at McBurney's point, negative Smith's sign, no left CVA tenderness, negative Rovsing's sign and no right CVA tenderness.   Musculoskeletal: Normal range of motion.      Right lower leg: No edema.      Left lower leg: No edema.      Comments: Moves all extremities with normal tone, strength, and ROM.  Gait normal.   Lymphadenopathy:     She has no cervical adenopathy.   Neurological: She is alert and oriented to person, place, and time. She has normal motor skills, normal sensation and intact cranial nerves. She displays no weakness, facial symmetry and normal reflexes. No cranial nerve deficit or sensory deficit. She exhibits normal muscle tone. She has a normal Finger-Nose-Finger Test. She shows no pronator drift. She displays no seizure activity. Gait and coordination normal. Coordination normal. GCS eye subscore is 4. GCS verbal subscore is 5. GCS motor subscore is 6.   Skin: Skin is warm, dry, not diaphoretic and no rash. Capillary refill takes less than 2 seconds. Psychiatric: Her speech is normal and behavior is normal. Mood and thought content normal.   Nursing note and vitals reviewed.    Results for orders placed or performed in visit on 11/06/20   POCT COVID-19 Rapid Screening   Result Value Ref Range    POC Rapid COVID Positive (A) Negative     Acceptable Yes              Assessment:       1. COVID-19 virus infection    2. Dry cough    3. Screening examination for unspecified infectious disease        Nontoxic appearing.  Vitals are stable. Patient presents for COVID nasal swab testing. Patient is concerned for possible exposure. Rapid covid +.  Patient has mild symptoms at this time.  All diagnostic testing personally reviewed and interpreted.       Patient was recommended OTC treatments for their symptoms. Patient was also prescribed medications for their symptoms.   We discussed quarantine in depth.    Patient was counseled, explained with the test results meaning, expected COVID course, and answered all of questions. They can also receive results via my chart.  Printed and verbal COVID guidelines were given. Patient understands that they received an Urgent Care treatment only and that they may be released before all your medical problems are known or treated. Strict ED versus clinic precautions given.  Patient verbalized understanding and agreed with plan of care.    Note dictated with voice recognition software, please excuse any grammatical errors.       Plan:         COVID-19 virus infection    Dry cough  -     benzonatate (TESSALON) 100 MG capsule; Take 1 capsule (100 mg total) by mouth 3 (three) times daily as needed for Cough.  Dispense: 30 capsule; Refill: 0  -     promethazine-dextromethorphan (PROMETHAZINE-DM) 6.25-15 mg/5 mL Syrp; Take 5 mLs by mouth nightly as needed. Do not take maximum of 30mLs in 24hrs  Dispense: 118 mL; Refill: 0    Screening examination for unspecified infectious disease  -     POCT COVID-19 Rapid Screening           Patient Instructions     PLEASE READ YOUR DISCHARGE INSTRUCTIONS ENTIRELY AS IT CONTAINS IMPORTANT INFORMATION.    Patient had covid testing done today.      If Positive: improved symptoms, no fever without fever reducing meds for 24 hours- have to be out for a minimum of 10 days passed from when symptoms first appeared with improvements in respiratory symptoms.      Discussed corona virus precautions and reviewed CDC FAC; printed a copy for patient.  I discussed to continue to  monitor their symptoms. Discussed that if their symptoms persist or worsen to seek re-evaluation. Clinic vs. ER precautions were given.  Patient verbalized understanding and agreed with the above plan of care.    - Rest.  Drink plenty of fluids.    - Tylenol as directed as needed for fever/pain.  For Tylenol, do not exceed 4000 mg/ day. If no contraindication.    - Reviewed radiographs and all diagnostic testing with patient/family.    - do not operate machinery when taking cough syrup as they may cause drowsiness.  - take Tessalon and  cough syrupas needed for cough at night.         -Below are suggestions for symptomatic relief:              -Salt water gargles to soothe throat pain.              -Chloroseptic spray also helps to numb throat pain.              -Nasal saline spray reduces inflammation and dryness.              -Warm face compresses to help with facial sinus pain/pressure.              -Vicks vapor rub at night.              -Flonase OTC or Nasacort OTC for nasal congestion.              -Simple foods like chicken noodle soup.              -Mucinex for cough during the day time. Delsym helps with coughing at night. Mucinex-D if you have chest congestion or sputum (caution if history of high blood pressure or palpitations).              -Zyrtec/Claritin during the day & Benadryl at night may help with allergies.  -If you DO NOT have Hypertension or any history of palpitations, it is ok to take over the counter Sudafed or Mucinex D or Allegra-D or Claritin-D or Zyrtec-D.  -If you do take one of the above, it is ok to combine that with plain over the counter Mucinex or Allegra or Claritin or Zyrtec. If, for example, you are taking Zyrtec -D, you can combine that with Mucinex, but not Mucinex-D.  If you are taking Mucinex-D, you can combine that with plain Allegra or Claritin or Zyrtec.   -If you DO have Hypertension or palpitations, it is safe to take Coricidin HBP for relief of sinus  symptoms.      For your GI symptoms:  -  -Use gatorade/pedialyte or rehydration packets to help stay hydrated. Vitamin water and plain water do not contain rehydrating electrolytes.  -Increase clear liquids (water, gatorade, pedialyte, broths, jello, etc) Hold off on solids for 12-18 hours. Then advance to BRAT diet (banana, rice, applesauce, tea, toast/crackers), then advance further as tolerated. Avoid spicy or fatty foods.   -Use Peptobismol or Immodium to help alleviate your diarrhea symptoms.   -Avoid imodium unless you have more than 6 loose stools in 24 hours.   -Wash hands frequently while sick. Avoid ibuprofen or other NSAIDS until you are well.   -Please go to the ER if you experience worsening pain, blood in your vomit or stool, high fever, dizziness, fainting, swelling of your abdomen, inability to pass gas or stool.         -You must understand that you've received an Urgent Care treatment only and that you may be released before all your medical problems are known or treated. You, the patient, will arrange for follow up care as instructed. Please arrange follow up with your primary medical clinic within 2-5 days if your signs and symptoms have not resolved or worsen.   - Follow up with your PCP or specialty clinic as directed.  You can call [phone] or [phone] to schedule an appointment with the appropriate provider.    - If your condition worsens or fails to improve we recommend that you receive another evaluation at the emergency room immediately or contact your primary medical clinic to discuss your concerns.                Instructions for Patients Awaiting COVID-19 Test Results    You will either be called with your test result or it will be released to the patient portal.  If you have any questions about your test, please visit [email or web address] or call our COVID-19 information line at [phone].    Prevention steps for patients with confirmed or suspected  COVID-19       Stay home and stay away from family members and friends. The CDC says, you can leave home after these three things have happened: 1) You have had no fever for at least 24 hours (that is one full day of no fever without the use of medicine that reduces fevers) 2) AND other symptoms have improved (for example, when your cough or shortness of breath have improved) 3) AND at least 10 days have passed since your symptoms first appeared.   Separate yourself from other people and animals in your home.   Call ahead before visiting your doctor.   Wear a facemask.   Cover your coughs and sneezes.   Wash your hands often with soap and water; hand  can be used, too.   Avoid sharing personal household items.   Wipe down surfaces used daily.   Monitor your symptoms. Seek prompt medical attention if your illness is worsening (e.g., difficulty breathing).    Before seeking care, call your healthcare provider.   If you have a medical emergency and need to call 911, notify the dispatch personnel that you have, or are being evaluated for COVID-19. If possible, put on a facemask before emergency medical services arrive.        Recommended precautions for household members, intimate partners, and caregivers in a home setting of a patient with symptomatic laboratory-confirmed COVID-19 or a patient under investigation.  Household members, intimate partners, and caregivers in the home setting awaiting tests results have close contact with a person with symptomatic, laboratory-confirmed COVID-19 or a person under investigation. Close contacts should monitor their health; they should call their provider right away if they develop symptoms suggestive of COVID-19 (e.g., fever, cough, shortness of breath).    Close contacts should also follow these recommendations:   Make sure that you understand and can help the patient follow their provider's instructions for medication(s) and care. You should help the  patient with basic needs in the home and provide support for getting groceries, prescriptions, and other personal needs.   Monitor the patient's symptoms. If the patient is getting sicker, call his or her healthcare provider and tell them that the patient has laboratory-confirmed COVID-19. If the patient has a medical emergency and you need to call 911, notify the dispatch personnel that the patient has, or is being evaluated for COVID-19.   Household members should stay in another room or be  from the patient. Household members should use a separate bedroom and bathroom, if available.   Prohibit visitors.   Household members should care for any pets in the home.   Make sure that shared spaces in the home have good air flow, such as by an air conditioner or an opened window, weather permitting.   Perform hand hygiene frequently. Wash your hands often with soap and water for at least 20 seconds or use an alcohol-based hand  (that contains > 60% alcohol) covering all surfaces of your hands and rubbing them together until they feel dry. Soap and water should be used preferentially.   Avoid touching your eyes, nose, and mouth.   The patient should wear a facemask. If the patient is not able to wear a facemask (for example, because it causes trouble breathing), caregivers should wear a mask when they are in the same room as the patient.   Wear a disposable facemask and gloves when you touch or have contact with the patient's blood, stool, or body fluids, such as saliva, sputum, nasal mucus, vomit, urine.  o Throw out disposable facemasks and gloves after using them. Do not reuse.  o When removing personal protective equipment, first remove and dispose of gloves. Then, immediately clean your hands with soap and water or alcohol-based hand . Next, remove and dispose of facemask, and immediately clean your hands again with soap and water or alcohol-based hand .   You should not  share dishes, drinking glasses, cups, eating utensils, towels, bedding, or other items with the patient. After the patient uses these items, you should wash them thoroughly (see below Wash laundry thoroughly).   Clean all high-touch surfaces, such as counters, tabletops, doorknobs, bathroom fixtures, toilets, phones, keyboards, tablets, and bedside tables, every day. Also, clean any surfaces that may have blood, stool, or body fluids on them.   Use a household cleaning spray or wipe, according to the label instructions. Labels contain instructions for safe and effective use of the cleaning product including precautions you should take when applying the product, such as wearing gloves and making sure you have good ventilation during use of the product.   Wash laundry thoroughly.  o Immediately remove and wash clothes or bedding that have blood, stool, or body fluids on them.  o Wear disposable gloves while handling soiled items and keep soiled items away from your body. Clean your hands (with soap and water or an alcohol-based hand ) immediately after removing your gloves.  o Read and follow directions on labels of laundry or clothing items and detergent. In general, using a normal laundry detergent according to washing machine instructions and dry thoroughly using the warmest temperatures recommended on the clothing label.   Place all used disposable gloves, facemasks, and other contaminated items in a lined container before disposing of them with other household waste. Clean your hands (with soap and water or an alcohol-based hand ) immediately after handling these items. Soap and water should be used preferentially if hands are visibly dirty.   Discuss any additional questions with your state or local health department or healthcare provider. Check available hours when contacting your local health department.    For more information see CDC link below.       https://www.cdc.gov/coronavirus/2019-ncov/hcp/guidance-prevent-spread.html#precautions        Sources:  [facility], [place] [facility]          Instructions for Home Care of Patients and Caretakers with Coronavirus Disease 2019     Limit visitors to the home.  Older persons and those that have chronic medical conditions such as diabetes, lung and heart disease are at increased risk for illness.    If possible, patients should use a separate bedroom while recovering. Caregivers and household members should avoid prolonged contact with the patient which means to stay 6 feet away and avoid contact with cough droplets.  When close contact is necessary, wash your hands before and immediately after contact.    Perform hand hygiene frequently. Wash your hands often with soap and water for at least 20 seconds or use an alcohol-based hand , covering all surfaces of your hands and rubbing them together until they feel dry.    Avoid touching your eyes, nose, and mouth with unwashed hands.   Avoid sharing household items with the patient. You should not share dishes, drinking glasses, cups, eating utensils, towels, bedding, or other items. After the patient uses these items, you should wash them thoroughly.   Wash laundry thoroughly.   o Immediately remove and wash clothes or bedding that have blood, stool, or body fluids on them.   Clean all high-touch surfaces, such as counters, tabletops, doorknobs, bathroom fixtures, toilets, phones, keyboards, tablets, and bedside tables, every day.   o Use a household cleaning spray or wipe, according to the label instructions. Labels contain instructions for safe and effective use of the cleaning product including precautions you should take when applying the product, such as wearing gloves and making sure you have good ventilation during use of the product.    For more information see CDC link below.       https://www.cdc.gov/coronavirus/2019-ncov/hcp/guidance-prevent-spread.html#precautions               If your symptoms worsen or if you have any other concerns, please contact [facility] at [phone].

## 2020-11-06 NOTE — PATIENT INSTRUCTIONS
PLEASE READ YOUR DISCHARGE INSTRUCTIONS ENTIRELY AS IT CONTAINS IMPORTANT INFORMATION.    Patient had covid testing done today.      If Positive: improved symptoms, no fever without fever reducing meds for 24 hours- have to be out for a minimum of 10 days passed from when symptoms first appeared with improvements in respiratory symptoms.      Discussed corona virus precautions and reviewed CDC FAC; printed a copy for patient.  I discussed to continue to monitor their symptoms. Discussed that if their symptoms persist or worsen to seek re-evaluation. Clinic vs. ER precautions were given.  Patient verbalized understanding and agreed with the above plan of care.    - Rest.  Drink plenty of fluids.    - Tylenol as directed as needed for fever/pain.  For Tylenol, do not exceed 4000 mg/ day. If no contraindication.    - Reviewed radiographs and all diagnostic testing with patient/family.    - do not operate machinery when taking cough syrup as they may cause drowsiness.  - take Tessalon and  cough syrupas needed for cough at night.         -Below are suggestions for symptomatic relief:              -Salt water gargles to soothe throat pain.              -Chloroseptic spray also helps to numb throat pain.              -Nasal saline spray reduces inflammation and dryness.              -Warm face compresses to help with facial sinus pain/pressure.              -Vicks vapor rub at night.              -Flonase OTC or Nasacort OTC for nasal congestion.              -Simple foods like chicken noodle soup.              -Mucinex for cough during the day time. Delsym helps with coughing at night. Mucinex-D if you have chest congestion or sputum (caution if history of high blood pressure or palpitations).              -Zyrtec/Claritin during the day & Benadryl at night may help with allergies.  -If you DO NOT have Hypertension or any history of palpitations, it is ok to take over the counter Sudafed or Mucinex D or Allegra-D or  Claritin-D or Zyrtec-D.  -If you do take one of the above, it is ok to combine that with plain over the counter Mucinex or Allegra or Claritin or Zyrtec. If, for example, you are taking Zyrtec -D, you can combine that with Mucinex, but not Mucinex-D.  If you are taking Mucinex-D, you can combine that with plain Allegra or Claritin or Zyrtec.   -If you DO have Hypertension or palpitations, it is safe to take Coricidin HBP for relief of sinus symptoms.      For your GI symptoms:  -  -Use gatorade/pedialyte or rehydration packets to help stay hydrated. Vitamin water and plain water do not contain rehydrating electrolytes.  -Increase clear liquids (water, gatorade, pedialyte, broths, jello, etc) Hold off on solids for 12-18 hours. Then advance to BRAT diet (banana, rice, applesauce, tea, toast/crackers), then advance further as tolerated. Avoid spicy or fatty foods.   -Use Peptobismol or Immodium to help alleviate your diarrhea symptoms.   -Avoid imodium unless you have more than 6 loose stools in 24 hours.   -Wash hands frequently while sick. Avoid ibuprofen or other NSAIDS until you are well.   -Please go to the ER if you experience worsening pain, blood in your vomit or stool, high fever, dizziness, fainting, swelling of your abdomen, inability to pass gas or stool.         -You must understand that you've received an Urgent Care treatment only and that you may be released before all your medical problems are known or treated. You, the patient, will arrange for follow up care as instructed. Please arrange follow up with your primary medical clinic within 2-5 days if your signs and symptoms have not resolved or worsen.   - Follow up with your PCP or specialty clinic as directed.  You can call [phone] or [phone] to schedule an appointment with the appropriate provider.    - If your condition worsens or fails to improve we recommend that you receive another evaluation at the emergency room immediately or  contact your primary medical clinic to discuss your concerns.                Instructions for Patients Awaiting COVID-19 Test Results    You will either be called with your test result or it will be released to the patient portal.  If you have any questions about your test, please visit [email or web address] or call our COVID-19 information line at [phone].    Prevention steps for patients with confirmed or suspected COVID-19       Stay home and stay away from family members and friends. The CDC says, you can leave home after these three things have happened: 1) You have had no fever for at least 24 hours (that is one full day of no fever without the use of medicine that reduces fevers) 2) AND other symptoms have improved (for example, when your cough or shortness of breath have improved) 3) AND at least 10 days have passed since your symptoms first appeared.   Separate yourself from other people and animals in your home.   Call ahead before visiting your doctor.   Wear a facemask.   Cover your coughs and sneezes.   Wash your hands often with soap and water; hand  can be used, too.   Avoid sharing personal household items.   Wipe down surfaces used daily.   Monitor your symptoms. Seek prompt medical attention if your illness is worsening (e.g., difficulty breathing).    Before seeking care, call your healthcare provider.   If you have a medical emergency and need to call 911, notify the dispatch personnel that you have, or are being evaluated for COVID-19. If possible, put on a facemask before emergency medical services arrive.        Recommended precautions for household members, intimate partners, and caregivers in a home setting of a patient with symptomatic laboratory-confirmed COVID-19 or a patient under investigation.  Household members, intimate partners, and caregivers in the home setting awaiting tests results have close contact with a person with symptomatic,  laboratory-confirmed COVID-19 or a person under investigation. Close contacts should monitor their health; they should call their provider right away if they develop symptoms suggestive of COVID-19 (e.g., fever, cough, shortness of breath).    Close contacts should also follow these recommendations:   Make sure that you understand and can help the patient follow their provider's instructions for medication(s) and care. You should help the patient with basic needs in the home and provide support for getting groceries, prescriptions, and other personal needs.   Monitor the patient's symptoms. If the patient is getting sicker, call his or her healthcare provider and tell them that the patient has laboratory-confirmed COVID-19. If the patient has a medical emergency and you need to call 911, notify the dispatch personnel that the patient has, or is being evaluated for COVID-19.   Household members should stay in another room or be  from the patient. Household members should use a separate bedroom and bathroom, if available.   Prohibit visitors.   Household members should care for any pets in the home.   Make sure that shared spaces in the home have good air flow, such as by an air conditioner or an opened window, weather permitting.   Perform hand hygiene frequently. Wash your hands often with soap and water for at least 20 seconds or use an alcohol-based hand  (that contains > 60% alcohol) covering all surfaces of your hands and rubbing them together until they feel dry. Soap and water should be used preferentially.   Avoid touching your eyes, nose, and mouth.   The patient should wear a facemask. If the patient is not able to wear a facemask (for example, because it causes trouble breathing), caregivers should wear a mask when they are in the same room as the patient.   Wear a disposable facemask and gloves when you touch or have contact with the patient's blood, stool, or body fluids, such  as saliva, sputum, nasal mucus, vomit, urine.  o Throw out disposable facemasks and gloves after using them. Do not reuse.  o When removing personal protective equipment, first remove and dispose of gloves. Then, immediately clean your hands with soap and water or alcohol-based hand . Next, remove and dispose of facemask, and immediately clean your hands again with soap and water or alcohol-based hand .   You should not share dishes, drinking glasses, cups, eating utensils, towels, bedding, or other items with the patient. After the patient uses these items, you should wash them thoroughly (see below Wash laundry thoroughly).   Clean all high-touch surfaces, such as counters, tabletops, doorknobs, bathroom fixtures, toilets, phones, keyboards, tablets, and bedside tables, every day. Also, clean any surfaces that may have blood, stool, or body fluids on them.   Use a household cleaning spray or wipe, according to the label instructions. Labels contain instructions for safe and effective use of the cleaning product including precautions you should take when applying the product, such as wearing gloves and making sure you have good ventilation during use of the product.   Wash laundry thoroughly.  o Immediately remove and wash clothes or bedding that have blood, stool, or body fluids on them.  o Wear disposable gloves while handling soiled items and keep soiled items away from your body. Clean your hands (with soap and water or an alcohol-based hand ) immediately after removing your gloves.  o Read and follow directions on labels of laundry or clothing items and detergent. In general, using a normal laundry detergent according to washing machine instructions and dry thoroughly using the warmest temperatures recommended on the clothing label.   Place all used disposable gloves, facemasks, and other contaminated items in a lined container before disposing of them with other household  waste. Clean your hands (with soap and water or an alcohol-based hand ) immediately after handling these items. Soap and water should be used preferentially if hands are visibly dirty.   Discuss any additional questions with your state or local health department or healthcare provider. Check available hours when contacting your local health department.    For more information see CDC link below.      https://www.cdc.gov/coronavirus/2019-ncov/hcp/guidance-prevent-spread.html#precautions        Sources:  [facility], [place] [facility]          Instructions for Home Care of Patients and Caretakers with Coronavirus Disease 2019     Limit visitors to the home.  Older persons and those that have chronic medical conditions such as diabetes, lung and heart disease are at increased risk for illness.    If possible, patients should use a separate bedroom while recovering. Caregivers and household members should avoid prolonged contact with the patient which means to stay 6 feet away and avoid contact with cough droplets.  When close contact is necessary, wash your hands before and immediately after contact.    Perform hand hygiene frequently. Wash your hands often with soap and water for at least 20 seconds or use an alcohol-based hand , covering all surfaces of your hands and rubbing them together until they feel dry.    Avoid touching your eyes, nose, and mouth with unwashed hands.   Avoid sharing household items with the patient. You should not share dishes, drinking glasses, cups, eating utensils, towels, bedding, or other items. After the patient uses these items, you should wash them thoroughly.   Wash laundry thoroughly.   o Immediately remove and wash clothes or bedding that have blood, stool, or body fluids on them.   Clean all high-touch surfaces, such as counters, tabletops, doorknobs, bathroom fixtures, toilets, phones, keyboards, tablets, and bedside tables, every  day.   o Use a household cleaning spray or wipe, according to the label instructions. Labels contain instructions for safe and effective use of the cleaning product including precautions you should take when applying the product, such as wearing gloves and making sure you have good ventilation during use of the product.    For more information see CDC link below.      https://www.cdc.gov/coronavirus/2019-ncov/hcp/guidance-prevent-spread.html#precautions               If your symptoms worsen or if you have any other concerns, please contact [facility] at [phone].

## 2021-04-16 ENCOUNTER — PATIENT MESSAGE (OUTPATIENT)
Dept: RESEARCH | Facility: HOSPITAL | Age: 66
End: 2021-04-16

## 2021-08-25 ENCOUNTER — TELEPHONE (OUTPATIENT)
Dept: OBSTETRICS AND GYNECOLOGY | Facility: CLINIC | Age: 66
End: 2021-08-25

## 2021-09-09 ENCOUNTER — OFFICE VISIT (OUTPATIENT)
Dept: URGENT CARE | Facility: CLINIC | Age: 66
End: 2021-09-09
Payer: MEDICARE

## 2021-09-09 VITALS
HEIGHT: 68 IN | HEART RATE: 75 BPM | OXYGEN SATURATION: 96 % | WEIGHT: 170 LBS | BODY MASS INDEX: 25.76 KG/M2 | RESPIRATION RATE: 18 BRPM | SYSTOLIC BLOOD PRESSURE: 113 MMHG | TEMPERATURE: 99 F | DIASTOLIC BLOOD PRESSURE: 77 MMHG

## 2021-09-09 DIAGNOSIS — J02.9 SORE THROAT: Primary | ICD-10-CM

## 2021-09-09 LAB
CTP QC/QA: YES
SARS-COV-2 RDRP RESP QL NAA+PROBE: NEGATIVE

## 2021-09-09 PROCEDURE — 99211 OFF/OP EST MAY X REQ PHY/QHP: CPT | Mod: S$GLB,,, | Performed by: PHYSICIAN ASSISTANT

## 2021-09-09 PROCEDURE — 87635: ICD-10-PCS | Mod: QW,CR,S$GLB, | Performed by: PHYSICIAN ASSISTANT

## 2021-09-09 PROCEDURE — 87635 SARS-COV-2 COVID-19 AMP PRB: CPT | Mod: QW,CR,S$GLB, | Performed by: PHYSICIAN ASSISTANT

## 2021-09-09 PROCEDURE — 99211 PR OFFICE/OUTPT VISIT, EST, LEVL I: ICD-10-PCS | Mod: S$GLB,,, | Performed by: PHYSICIAN ASSISTANT

## 2021-09-09 RX ORDER — ROSUVASTATIN CALCIUM 20 MG/1
20 TABLET, COATED ORAL NIGHTLY
COMMUNITY
Start: 2021-05-05 | End: 2022-07-13 | Stop reason: SDUPTHER

## 2021-11-01 ENCOUNTER — OFFICE VISIT (OUTPATIENT)
Dept: OBSTETRICS AND GYNECOLOGY | Facility: CLINIC | Age: 66
End: 2021-11-01
Attending: OBSTETRICS & GYNECOLOGY
Payer: MEDICARE

## 2021-11-01 VITALS
WEIGHT: 190.63 LBS | SYSTOLIC BLOOD PRESSURE: 127 MMHG | BODY MASS INDEX: 29.92 KG/M2 | DIASTOLIC BLOOD PRESSURE: 89 MMHG | HEIGHT: 67 IN

## 2021-11-01 DIAGNOSIS — Z01.419 ENCOUNTER FOR GYNECOLOGICAL EXAMINATION WITHOUT ABNORMAL FINDING: ICD-10-CM

## 2021-11-01 DIAGNOSIS — N94.19 DYSPAREUNIA DUE TO MEDICAL CONDITION IN FEMALE: ICD-10-CM

## 2021-11-01 DIAGNOSIS — Z12.31 OTHER SCREENING MAMMOGRAM: Primary | ICD-10-CM

## 2021-11-01 PROCEDURE — G0101 PR CA SCREEN;PELVIC/BREAST EXAM: ICD-10-PCS | Mod: S$GLB,,, | Performed by: OBSTETRICS & GYNECOLOGY

## 2021-11-01 PROCEDURE — G0101 CA SCREEN;PELVIC/BREAST EXAM: HCPCS | Mod: S$GLB,,, | Performed by: OBSTETRICS & GYNECOLOGY

## 2021-11-01 RX ORDER — ESTRADIOL 0.1 MG/G
1 CREAM VAGINAL
Qty: 42.5 G | Refills: 11 | Status: SHIPPED | OUTPATIENT
Start: 2021-11-01 | End: 2022-11-08

## 2021-11-01 RX ORDER — MULTIVITAMIN
TABLET ORAL
COMMUNITY
Start: 2021-07-20

## 2021-11-01 RX ORDER — CALCIUM CARBONATE/VITAMIN D3 500 MG-10
TABLET ORAL
COMMUNITY
Start: 2021-07-20 | End: 2022-10-21 | Stop reason: ALTCHOICE

## 2021-11-03 ENCOUNTER — HOSPITAL ENCOUNTER (OUTPATIENT)
Dept: RADIOLOGY | Facility: OTHER | Age: 66
Discharge: HOME | End: 2021-11-03
Attending: OBSTETRICS & GYNECOLOGY
Payer: MEDICARE

## 2021-11-03 DIAGNOSIS — Z12.31 OTHER SCREENING MAMMOGRAM: ICD-10-CM

## 2021-11-03 PROCEDURE — 77067 MAMMO DIGITAL SCREENING BILAT WITH TOMO: ICD-10-PCS | Mod: 26,,, | Performed by: RADIOLOGY

## 2021-11-03 PROCEDURE — 77067 SCR MAMMO BI INCL CAD: CPT | Mod: 26,,, | Performed by: RADIOLOGY

## 2021-11-03 PROCEDURE — 77063 MAMMO DIGITAL SCREENING BILAT WITH TOMO: ICD-10-PCS | Mod: 26,,, | Performed by: RADIOLOGY

## 2021-11-03 PROCEDURE — 77063 BREAST TOMOSYNTHESIS BI: CPT | Mod: 26,,, | Performed by: RADIOLOGY

## 2021-11-03 PROCEDURE — 77067 SCR MAMMO BI INCL CAD: CPT | Mod: TC

## 2021-11-10 ENCOUNTER — CLINICAL SUPPORT (OUTPATIENT)
Dept: REHABILITATION | Facility: OTHER | Age: 66
End: 2021-11-10
Attending: OBSTETRICS & GYNECOLOGY
Payer: MEDICARE

## 2021-11-10 DIAGNOSIS — N94.10 DYSPAREUNIA, FEMALE: ICD-10-CM

## 2021-11-10 DIAGNOSIS — R27.9 LACK OF COORDINATION: ICD-10-CM

## 2021-11-10 DIAGNOSIS — R53.1 WEAKNESS: ICD-10-CM

## 2021-11-10 DIAGNOSIS — N94.19 DYSPAREUNIA DUE TO MEDICAL CONDITION IN FEMALE: ICD-10-CM

## 2021-11-10 PROCEDURE — 97161 PT EVAL LOW COMPLEX 20 MIN: CPT | Mod: PN

## 2021-11-10 PROCEDURE — 97110 THERAPEUTIC EXERCISES: CPT | Mod: PN

## 2021-11-10 PROCEDURE — 97530 THERAPEUTIC ACTIVITIES: CPT | Mod: PN

## 2022-01-31 DIAGNOSIS — R00.1 BRADYCARDIA: ICD-10-CM

## 2022-01-31 DIAGNOSIS — Z95.0 CARDIAC PACEMAKER IN SITU: Primary | ICD-10-CM

## 2022-02-11 ENCOUNTER — DOCUMENTATION ONLY (OUTPATIENT)
Dept: REHABILITATION | Facility: OTHER | Age: 67
End: 2022-02-11
Payer: MEDICARE

## 2022-02-11 PROBLEM — N94.10 DYSPAREUNIA, FEMALE: Status: RESOLVED | Noted: 2021-11-10 | Resolved: 2022-02-11

## 2022-02-11 PROBLEM — R53.1 WEAKNESS: Status: RESOLVED | Noted: 2021-11-10 | Resolved: 2022-02-11

## 2022-02-11 PROBLEM — R27.9 LACK OF COORDINATION: Status: RESOLVED | Noted: 2021-11-10 | Resolved: 2022-02-11

## 2022-02-11 NOTE — PROGRESS NOTES
"[facility]  Physical Therapy Discharge Note    Name: Lucy Booth  Clinic Number: [id number]    Therapy Diagnosis:   Encounter Diagnoses   Name Primary?    Dyspareunia due to medical condition in female      Weakness      Lack of coordination      Dyspareunia, female        Physician: Isabela Saleh, *    Physician Orders: PT Eval and Treat   Medical Diagnosis from Referral: N94.19 (ICD-10-CM) - Dyspareunia due to medical condition in female   Evaluation Date: 11/10/2021    Date of Last visit: 11/10/2021  Total Visits Received: 1    ASSESSMENT      Pt did not attend any follow up visits after initial evaluation. Pt deferred treatment. Pt's POC has  and pt will be d/c from PT at this time.    Discharge reason: Patient has not attended therapy since 11/10/2021, Patient requested discharge and Other:  POC has     Discharge FOTO Score: n/a    Goals: all goals not met  Short Term Goals: 6 weeks   Pt to demonstrate proper diaphragmatic breathing to help with calming the nervous system in order to decrease pain and to improve abdominal wall musculature extensibility.   Pt to report a decrease in pain to no more than 3/10 at it's worst with intercourse.    Pt will report minimal to no pain with single digit pelvic assessment and display relaxation during this assessment in order to progress to dilator use.  Pt to perform "the knack" prior to coughing, laughing or sneezing to decrease risk of incontinence.       Long Term Goals: 12 weeks   Pt to be discharged with home plan for carry over after discharge.   Pt to report being able to have a comfortable vaginal exam without significant increase in pelvic pain.  Pt to report a decrease in pain to no more than 1/10 at it's worst with intercourse.    Pt to report elimination of incontinence with ADLs to demonstrate improved pelvic floor muscle strength and coordination  Pt to demonstrate an improved score in the FOTO Urinary " Problem survey to less than or equal to 30% limitation to demonstrate improving stress urinary incontinence.   PLAN   This patient is discharged from Physical Therapy      Pat Vaca, PT

## 2022-03-08 ENCOUNTER — HOSPITAL ENCOUNTER (OUTPATIENT)
Dept: CARDIOLOGY | Facility: CLINIC | Age: 67
Discharge: HOME | End: 2022-03-08
Attending: INTERNAL MEDICINE
Payer: MEDICARE

## 2022-03-08 ENCOUNTER — CLINICAL SUPPORT (OUTPATIENT)
Dept: CARDIOLOGY | Facility: HOSPITAL | Age: 67
End: 2022-03-08
Attending: INTERNAL MEDICINE
Payer: MEDICARE

## 2022-03-08 ENCOUNTER — OFFICE VISIT (OUTPATIENT)
Dept: ELECTROPHYSIOLOGY | Facility: CLINIC | Age: 67
End: 2022-03-08
Payer: MEDICARE

## 2022-03-08 VITALS
HEIGHT: 67 IN | SYSTOLIC BLOOD PRESSURE: 128 MMHG | WEIGHT: 186.31 LBS | BODY MASS INDEX: 29.24 KG/M2 | DIASTOLIC BLOOD PRESSURE: 88 MMHG | HEART RATE: 76 BPM

## 2022-03-08 DIAGNOSIS — Z95.0 PRESENCE OF CARDIAC PACEMAKER: ICD-10-CM

## 2022-03-08 DIAGNOSIS — R07.89 CHEST TIGHTNESS: Primary | ICD-10-CM

## 2022-03-08 DIAGNOSIS — R00.1 BRADYCARDIA: ICD-10-CM

## 2022-03-08 DIAGNOSIS — Z95.0 CARDIAC PACEMAKER IN SITU: ICD-10-CM

## 2022-03-08 PROCEDURE — 3074F SYST BP LT 130 MM HG: CPT | Mod: CPTII,S$GLB,, | Performed by: INTERNAL MEDICINE

## 2022-03-08 PROCEDURE — 93010 ELECTROCARDIOGRAM REPORT: CPT | Mod: S$GLB,,, | Performed by: INTERNAL MEDICINE

## 2022-03-08 PROCEDURE — 3079F PR MOST RECENT DIASTOLIC BLOOD PRESSURE 80-89 MM HG: ICD-10-PCS | Mod: CPTII,S$GLB,, | Performed by: INTERNAL MEDICINE

## 2022-03-08 PROCEDURE — 3288F FALL RISK ASSESSMENT DOCD: CPT | Mod: CPTII,S$GLB,, | Performed by: INTERNAL MEDICINE

## 2022-03-08 PROCEDURE — 1101F PR PT FALLS ASSESS DOC 0-1 FALLS W/OUT INJ PAST YR: ICD-10-PCS | Mod: CPTII,S$GLB,, | Performed by: INTERNAL MEDICINE

## 2022-03-08 PROCEDURE — 1101F PT FALLS ASSESS-DOCD LE1/YR: CPT | Mod: CPTII,S$GLB,, | Performed by: INTERNAL MEDICINE

## 2022-03-08 PROCEDURE — 1126F PR PAIN SEVERITY QUANTIFIED, NO PAIN PRESENT: ICD-10-PCS | Mod: CPTII,S$GLB,, | Performed by: INTERNAL MEDICINE

## 2022-03-08 PROCEDURE — 1159F MED LIST DOCD IN RCRD: CPT | Mod: CPTII,S$GLB,, | Performed by: INTERNAL MEDICINE

## 2022-03-08 PROCEDURE — 99205 OFFICE O/P NEW HI 60 MIN: CPT | Mod: S$GLB,,, | Performed by: INTERNAL MEDICINE

## 2022-03-08 PROCEDURE — 3074F PR MOST RECENT SYSTOLIC BLOOD PRESSURE < 130 MM HG: ICD-10-PCS | Mod: CPTII,S$GLB,, | Performed by: INTERNAL MEDICINE

## 2022-03-08 PROCEDURE — 1159F PR MEDICATION LIST DOCUMENTED IN MEDICAL RECORD: ICD-10-PCS | Mod: CPTII,S$GLB,, | Performed by: INTERNAL MEDICINE

## 2022-03-08 PROCEDURE — 1126F AMNT PAIN NOTED NONE PRSNT: CPT | Mod: CPTII,S$GLB,, | Performed by: INTERNAL MEDICINE

## 2022-03-08 PROCEDURE — 99999 PR PBB SHADOW E&M-EST. PATIENT-LVL III: CPT | Mod: PBBFAC,,, | Performed by: INTERNAL MEDICINE

## 2022-03-08 PROCEDURE — 3288F PR FALLS RISK ASSESSMENT DOCUMENTED: ICD-10-PCS | Mod: CPTII,S$GLB,, | Performed by: INTERNAL MEDICINE

## 2022-03-08 PROCEDURE — 3008F BODY MASS INDEX DOCD: CPT | Mod: CPTII,S$GLB,, | Performed by: INTERNAL MEDICINE

## 2022-03-08 PROCEDURE — 99999 PR PBB SHADOW E&M-EST. PATIENT-LVL III: ICD-10-PCS | Mod: PBBFAC,,, | Performed by: INTERNAL MEDICINE

## 2022-03-08 PROCEDURE — 93280 CARDIAC DEVICE CHECK - IN CLINIC & HOSPITAL: ICD-10-PCS | Mod: 26,,, | Performed by: INTERNAL MEDICINE

## 2022-03-08 PROCEDURE — 93280 PM DEVICE PROGR EVAL DUAL: CPT | Mod: 26,,, | Performed by: INTERNAL MEDICINE

## 2022-03-08 PROCEDURE — 99205 PR OFFICE/OUTPT VISIT, NEW, LEVL V, 60-74 MIN: ICD-10-PCS | Mod: S$GLB,,, | Performed by: INTERNAL MEDICINE

## 2022-03-08 PROCEDURE — 3079F DIAST BP 80-89 MM HG: CPT | Mod: CPTII,S$GLB,, | Performed by: INTERNAL MEDICINE

## 2022-03-08 PROCEDURE — 3008F PR BODY MASS INDEX (BMI) DOCUMENTED: ICD-10-PCS | Mod: CPTII,S$GLB,, | Performed by: INTERNAL MEDICINE

## 2022-03-08 PROCEDURE — 93005 RHYTHM STRIP: ICD-10-PCS | Mod: S$GLB,,, | Performed by: INTERNAL MEDICINE

## 2022-03-08 PROCEDURE — 93010 RHYTHM STRIP: ICD-10-PCS | Mod: S$GLB,,, | Performed by: INTERNAL MEDICINE

## 2022-03-08 PROCEDURE — 93280 PM DEVICE PROGR EVAL DUAL: CPT

## 2022-03-08 PROCEDURE — 93005 ELECTROCARDIOGRAM TRACING: CPT | Mod: S$GLB,,, | Performed by: INTERNAL MEDICINE

## 2022-03-08 RX ORDER — METOPROLOL SUCCINATE 25 MG/1
25 TABLET, EXTENDED RELEASE ORAL DAILY
COMMUNITY
Start: 2021-11-16 | End: 2022-07-13 | Stop reason: SDUPTHER

## 2022-03-08 NOTE — PROGRESS NOTES
Subjective:    Patient ID:  Lucy Booth is a 66 y.o. female who presents for evaluation of Pacemaker Problem      HPI  Patient is a 65 yo F with PPM placed by Dr. Garay for bradycardia presents to establish care. Per records patient had fatigue. On event monitor had second degree type 2 while sleeping and then during day had pulse oxs rates in 40s. 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 Patient states that she has had chest tightness with any exercise. She attributes change in symptoms to her PPM. Patient also notes this with climbing stairs. She does not have palpitations, does not feel fluttering. States clearly that it is a tightness in the center of her chest associated with exercise.   Device clinic shows normal device function. CLS adjusted to decrease any exercise related symptoms    ECG today with sinus rhythm frequent PACs. (did not note tightness during ecg)        Review of Systems   Constitutional: Negative.   HENT: Negative.    Eyes: Negative.    Cardiovascular: Negative.    Respiratory: Negative.    Endocrine: Negative.    Hematologic/Lymphatic: Negative.    Skin: Negative.    Musculoskeletal: Negative.    Gastrointestinal: Negative.    Genitourinary: Negative.    Neurological: Negative.    Psychiatric/Behavioral: Negative.    Allergic/Immunologic: Negative.         Objective:    Physical Exam  Constitutional:       Appearance: Normal appearance. She is normal weight.   HENT:      Head: Normocephalic and atraumatic.      Right Ear: External ear normal.      Left Ear: External ear normal.      Nose: Nose normal.      Mouth/Throat:      Mouth: Mucous membranes are moist.      Pharynx: Oropharynx is clear.   Eyes:      Extraocular Movements: Extraocular movements intact.      Conjunctiva/sclera: Conjunctivae normal.      Pupils: Pupils are equal, round, and reactive to light.   Cardiovascular:      Rate and Rhythm: Normal rate and regular rhythm.      Pulses: Normal  pulses.      Heart sounds: Normal heart sounds.   Pulmonary:      Effort: Pulmonary effort is normal.      Breath sounds: Normal breath sounds.   Abdominal:      General: Abdomen is flat. Bowel sounds are normal.      Palpations: Abdomen is soft.   Musculoskeletal:         General: Normal range of motion.      Cervical back: Normal range of motion and neck supple.   Skin:     General: Skin is warm.      Capillary Refill: Capillary refill takes less than 2 seconds.   Neurological:      General: No focal deficit present.      Mental Status: She is alert and oriented to person, place, and time. Mental status is at baseline.   Psychiatric:         Mood and Affect: Mood normal.           Assessment:       1. Chest tightness    2. Bradycardia    3. Presence of cardiac pacemaker         Plan:       Patient is a 65 yo F pmhx of intermittent bradycardia s/p PPM presents for evaluation. Since PPM patient has not noticed an improvement in symptoms in fact notes increase in exercise induced chest tightness. Previous Stress patient had chronotropic competence, ecg negative however no imaging and no echo. Symptoms are concerning for angina. No symptoms at rest. Also patient noted to have high PAC burden. CLS was adjusted to ensure no issues from pacemaker.  -flecainide for PACs if Stress negative  -Refer IC if +  -FU 3 months.              (4) no impairment

## 2022-03-09 ENCOUNTER — TELEPHONE (OUTPATIENT)
Dept: ELECTROPHYSIOLOGY | Facility: CLINIC | Age: 67
End: 2022-03-09
Payer: MEDICARE

## 2022-03-09 NOTE — TELEPHONE ENCOUNTER
Called pt's prev Cardiology Clinic and spoke with Irene at [phone].  Pt's [employer] monitoring is released so we can monitor her PPM at [facility].

## 2022-03-16 ENCOUNTER — HOSPITAL ENCOUNTER (OUTPATIENT)
Dept: CARDIOLOGY | Facility: HOSPITAL | Age: 67
Discharge: HOME | End: 2022-03-16
Attending: INTERNAL MEDICINE
Payer: MEDICARE

## 2022-03-16 VITALS — WEIGHT: 186 LBS | HEIGHT: 67 IN | BODY MASS INDEX: 29.19 KG/M2

## 2022-03-16 DIAGNOSIS — R07.89 CHEST TIGHTNESS: ICD-10-CM

## 2022-03-16 LAB
ASCENDING AORTA: 2.93 CM
BSA FOR ECHO PROCEDURE: 2 M2
CV ECHO LV RWT: 0.38 CM
CV STRESS BASE HR: 93 BPM
DIASTOLIC BLOOD PRESSURE: 86 MMHG
DOP CALC LVOT AREA: 3.2 CM2
DOP CALC LVOT DIAMETER: 2.01 CM
DOP CALC LVOT PEAK VEL: 1 M/S
DOP CALC LVOT STROKE VOLUME: 63.37 CM3
DOP CALCLVOT PEAK VEL VTI: 19.98 CM
E WAVE DECELERATION TIME: 179.28 MSEC
E/A RATIO: 0.68
E/E' RATIO: 8.33 M/S
ECHO LV POSTERIOR WALL: 0.8 CM (ref 0.6–1.1)
EJECTION FRACTION: 55 %
FRACTIONAL SHORTENING: 30 % (ref 28–44)
INTERVENTRICULAR SEPTUM: 0.8 CM (ref 0.6–1.1)
IVRT: 119.89 MSEC
LA MAJOR: 4.97 CM
LA MINOR: 5.27 CM
LA WIDTH: 3.42 CM
LEFT ATRIUM SIZE: 3.68 CM
LEFT ATRIUM VOLUME INDEX: 27.9 ML/M2
LEFT ATRIUM VOLUME: 54.73 CM3
LEFT INTERNAL DIMENSION IN SYSTOLE: 2.96 CM (ref 2.1–4)
LEFT VENTRICLE DIASTOLIC VOLUME INDEX: 40.49 ML/M2
LEFT VENTRICLE DIASTOLIC VOLUME: 79.36 ML
LEFT VENTRICLE MASS INDEX: 52 G/M2
LEFT VENTRICLE SYSTOLIC VOLUME INDEX: 17.3 ML/M2
LEFT VENTRICLE SYSTOLIC VOLUME: 33.83 ML
LEFT VENTRICULAR INTERNAL DIMENSION IN DIASTOLE: 4.22 CM (ref 3.5–6)
LEFT VENTRICULAR MASS: 102.09 G
LV LATERAL E/E' RATIO: 6.25 M/S
LV SEPTAL E/E' RATIO: 12.5 M/S
MV A" WAVE DURATION": 8.85 MSEC
MV PEAK A VEL: 0.74 M/S
MV PEAK E VEL: 0.5 M/S
MV STENOSIS PRESSURE HALF TIME: 51.99 MS
MV VALVE AREA P 1/2 METHOD: 4.23 CM2
OHS CV CPX 1 MINUTE RECOVERY HEART RATE: 113 BPM
OHS CV CPX 85 PERCENT MAX PREDICTED HEART RATE MALE: 126
OHS CV CPX ESTIMATED METS: 10
OHS CV CPX MAX PREDICTED HEART RATE: 149
OHS CV CPX PATIENT IS FEMALE: 1
OHS CV CPX PATIENT IS MALE: 0
OHS CV CPX PEAK DIASTOLIC BLOOD PRESSURE: 79 MMHG
OHS CV CPX PEAK HEAR RATE: 136 BPM
OHS CV CPX PEAK RATE PRESSURE PRODUCT: NORMAL
OHS CV CPX PEAK SYSTOLIC BLOOD PRESSURE: 155 MMHG
OHS CV CPX PERCENT MAX PREDICTED HEART RATE ACHIEVED: 91
OHS CV CPX RATE PRESSURE PRODUCT PRESENTING: NORMAL
PISA TR MAX VEL: 2.54 M/S
PULM VEIN S/D RATIO: 1.84
PV PEAK D VEL: 0.37 M/S
PV PEAK S VEL: 0.68 M/S
RA MAJOR: 4.02 CM
RA PRESSURE: 3 MMHG
RA WIDTH: 3.09 CM
RIGHT VENTRICULAR END-DIASTOLIC DIMENSION: 2.75 CM
RV TISSUE DOPPLER FREE WALL SYSTOLIC VELOCITY 1 (APICAL 4 CHAMBER VIEW): 12.44 CM/S
SINUS: 2.98 CM
STJ: 2.27 CM
STRESS ECHO POST EXERCISE DUR MIN: 6 MINUTES
STRESS ECHO POST EXERCISE DUR SEC: 15 SECONDS
STRESS ST DEPRESSION: 0.5 MM
SYSTOLIC BLOOD PRESSURE: 137 MMHG
TDI LATERAL: 0.08 M/S
TDI SEPTAL: 0.04 M/S
TDI: 0.06 M/S
TR MAX PG: 26 MMHG
TRICUSPID ANNULAR PLANE SYSTOLIC EXCURSION: 2.48 CM
TV REST PULMONARY ARTERY PRESSURE: 29 MMHG

## 2022-03-16 PROCEDURE — 93351 STRESS ECHO (CUPID ONLY): ICD-10-PCS | Mod: 26,,, | Performed by: INTERNAL MEDICINE

## 2022-03-16 PROCEDURE — 93351 STRESS TTE COMPLETE: CPT

## 2022-03-16 PROCEDURE — 93351 STRESS TTE COMPLETE: CPT | Mod: 26,,, | Performed by: INTERNAL MEDICINE

## 2022-06-07 ENCOUNTER — CLINICAL SUPPORT (OUTPATIENT)
Dept: CARDIOLOGY | Facility: HOSPITAL | Age: 67
End: 2022-06-07
Payer: MEDICARE

## 2022-06-07 DIAGNOSIS — Z95.0 PRESENCE OF CARDIAC PACEMAKER: ICD-10-CM

## 2022-06-07 PROCEDURE — 93296 REM INTERROG EVL PM/IDS: CPT | Performed by: INTERNAL MEDICINE

## 2022-06-07 PROCEDURE — 93294 CARDIAC DEVICE CHECK - REMOTE: ICD-10-PCS | Mod: ,,, | Performed by: INTERNAL MEDICINE

## 2022-06-07 PROCEDURE — 93294 REM INTERROG EVL PM/LDLS PM: CPT | Mod: ,,, | Performed by: INTERNAL MEDICINE

## 2022-07-13 ENCOUNTER — OFFICE VISIT (OUTPATIENT)
Dept: CARDIOLOGY | Facility: CLINIC | Age: 67
End: 2022-07-13
Payer: MEDICARE

## 2022-07-13 VITALS
OXYGEN SATURATION: 98 % | HEART RATE: 76 BPM | BODY MASS INDEX: 29.55 KG/M2 | DIASTOLIC BLOOD PRESSURE: 82 MMHG | SYSTOLIC BLOOD PRESSURE: 137 MMHG | WEIGHT: 188.25 LBS | HEIGHT: 67 IN

## 2022-07-13 DIAGNOSIS — Z95.0 PACEMAKER: ICD-10-CM

## 2022-07-13 DIAGNOSIS — R06.09 DOE (DYSPNEA ON EXERTION): Primary | ICD-10-CM

## 2022-07-13 DIAGNOSIS — E66.3 OVER WEIGHT: ICD-10-CM

## 2022-07-13 DIAGNOSIS — R07.89 OTHER CHEST PAIN: ICD-10-CM

## 2022-07-13 PROCEDURE — 99999 PR PBB SHADOW E&M-EST. PATIENT-LVL IV: ICD-10-PCS | Mod: PBBFAC,,, | Performed by: INTERNAL MEDICINE

## 2022-07-13 PROCEDURE — 99204 OFFICE O/P NEW MOD 45 MIN: CPT | Mod: S$GLB,,, | Performed by: INTERNAL MEDICINE

## 2022-07-13 PROCEDURE — 99204 PR OFFICE/OUTPT VISIT, NEW, LEVL IV, 45-59 MIN: ICD-10-PCS | Mod: S$GLB,,, | Performed by: INTERNAL MEDICINE

## 2022-07-13 PROCEDURE — 3079F PR MOST RECENT DIASTOLIC BLOOD PRESSURE 80-89 MM HG: ICD-10-PCS | Mod: CPTII,S$GLB,, | Performed by: INTERNAL MEDICINE

## 2022-07-13 PROCEDURE — 1101F PT FALLS ASSESS-DOCD LE1/YR: CPT | Mod: CPTII,S$GLB,, | Performed by: INTERNAL MEDICINE

## 2022-07-13 PROCEDURE — 1159F MED LIST DOCD IN RCRD: CPT | Mod: CPTII,S$GLB,, | Performed by: INTERNAL MEDICINE

## 2022-07-13 PROCEDURE — 3288F PR FALLS RISK ASSESSMENT DOCUMENTED: ICD-10-PCS | Mod: CPTII,S$GLB,, | Performed by: INTERNAL MEDICINE

## 2022-07-13 PROCEDURE — 3075F PR MOST RECENT SYSTOLIC BLOOD PRESS GE 130-139MM HG: ICD-10-PCS | Mod: CPTII,S$GLB,, | Performed by: INTERNAL MEDICINE

## 2022-07-13 PROCEDURE — 3008F PR BODY MASS INDEX (BMI) DOCUMENTED: ICD-10-PCS | Mod: CPTII,S$GLB,, | Performed by: INTERNAL MEDICINE

## 2022-07-13 PROCEDURE — 1126F AMNT PAIN NOTED NONE PRSNT: CPT | Mod: CPTII,S$GLB,, | Performed by: INTERNAL MEDICINE

## 2022-07-13 PROCEDURE — 3008F BODY MASS INDEX DOCD: CPT | Mod: CPTII,S$GLB,, | Performed by: INTERNAL MEDICINE

## 2022-07-13 PROCEDURE — 1126F PR PAIN SEVERITY QUANTIFIED, NO PAIN PRESENT: ICD-10-PCS | Mod: CPTII,S$GLB,, | Performed by: INTERNAL MEDICINE

## 2022-07-13 PROCEDURE — 3288F FALL RISK ASSESSMENT DOCD: CPT | Mod: CPTII,S$GLB,, | Performed by: INTERNAL MEDICINE

## 2022-07-13 PROCEDURE — 1101F PR PT FALLS ASSESS DOC 0-1 FALLS W/OUT INJ PAST YR: ICD-10-PCS | Mod: CPTII,S$GLB,, | Performed by: INTERNAL MEDICINE

## 2022-07-13 PROCEDURE — 99999 PR PBB SHADOW E&M-EST. PATIENT-LVL IV: CPT | Mod: PBBFAC,,, | Performed by: INTERNAL MEDICINE

## 2022-07-13 PROCEDURE — 3079F DIAST BP 80-89 MM HG: CPT | Mod: CPTII,S$GLB,, | Performed by: INTERNAL MEDICINE

## 2022-07-13 PROCEDURE — 3075F SYST BP GE 130 - 139MM HG: CPT | Mod: CPTII,S$GLB,, | Performed by: INTERNAL MEDICINE

## 2022-07-13 PROCEDURE — 1159F PR MEDICATION LIST DOCUMENTED IN MEDICAL RECORD: ICD-10-PCS | Mod: CPTII,S$GLB,, | Performed by: INTERNAL MEDICINE

## 2022-07-13 RX ORDER — ROSUVASTATIN CALCIUM 20 MG/1
20 TABLET, COATED ORAL NIGHTLY
Qty: 90 TABLET | Refills: 3 | Status: SHIPPED | OUTPATIENT
Start: 2022-07-13 | End: 2023-08-30

## 2022-07-13 RX ORDER — METOPROLOL SUCCINATE 25 MG/1
25 TABLET, EXTENDED RELEASE ORAL DAILY
Qty: 90 TABLET | Refills: 3 | Status: SHIPPED | OUTPATIENT
Start: 2022-07-13 | End: 2022-11-08

## 2022-07-13 NOTE — PROGRESS NOTES
Subjective:   Patient ID:  Lucy Booth is a 67 y.o. female is a new patient who presents for evaluation of Dizziness, Shortness of Breath (On exertion ), and Medication Refill    Patient is a 67 yo F with PPM placed by Dr. Garay for bradycardia presents to establish care. Per records patient had fatigue. On event monitor had second degree type 2 while sleeping and then during day had pulse oxs rates in 40s. 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 Patient states that she has had chest tightness with any exercise. She attributes change in symptoms to her PPM. Patient also notes this with climbing stairs. She does not have palpitations, does not feel fluttering. States clearly that it is a tightness in the center of her chest associated with exercise.   Device clinic shows normal device function. CLS adjusted to decrease any exercise related symptoms     ECG today with sinus rhythm frequent PACs. (did not note tightness during ecg)    HPI:   Patient had COVID 2020  DUNN with exercise and chest tightness something comes up from the stomach thinks this is pacemaker related.   Ex smoker   Denies palpitations or fluttering in the chest  Mother had stents and had valve replaced in 70s    Total cholesterol   LDL 89, total chol 167, TG 97    Cardiac device check 3/22  Additional Comments  In office interrogation of PPM  New pt establishing care  PPM implanted by Dr. Adkins/Dr. Garay  8/19/21  Pt set DDD-CLS 70 bpm,  pt reports lightheadedness and increased hrs at rest and getting out of a chair, starts with exercising, hasn't been able to exercise since PPM  Device fxn WNL  Presenting egram demonstrates AP/VS freq PVCs  Underlying rhythm c/w SB/SR 58-61 bpm   RA pacing   92%, RV pacing    3%    PVC karen 20%  Atrial arrhythmias: none   Ventricular arrhythmias: none   Battery Status/Longevity:  8 yrs 1 mos  Checked Retrograde and PVARP is appriopriate.  Reprogramming at this visit: Changed  "Magnet Mode from Auto-->Async  Changed CLS resp from High to Medium to see it resp is too agressive vs pt feeling PVCs  Pt will see Dr. Martin today in clinic  Will have Home monitoring transferred.  Will message [employer] to arrange transfer.  Asked pt to contact the clinic if she still feels increased heart rates with exertion as CLS may need further optimization with a rep from [employer]  F/U via remote interrogation in 3 mos  DG      3/2022  · The patient exercised for 6 minutes and 15 seconds on a high ramp protocol, corresponding to a functional capacity of 10 METS, achieving a peak heart rate of 136 bpm, which is 91% of the age predicted maximum heart rate.  · The test was stopped because the patient experienced fatigue.  · The patient's exercise capacity was normal.  · During stress, the following significant arrhythmias were observed: occasional PACs.  · The ECG portion of this study is negative for myocardial ischemia.  · The left ventricle is normal in size with normal systolic function. The estimated ejection fraction is 55%. Normal left ventricular diastolic function.  · Normal right ventricular size with normal right ventricular systolic function.  · The estimated PA systolic pressure is 29 mmHg.  · Normal central venous pressure (3 mmHg).  · The stress echo portion of this study is negative for myocardial ischemia.         Patient Active Problem List   Diagnosis    Osteopenia     /82 (BP Location: Left arm, Patient Position: Sitting, BP Method: Medium (Automatic))   Pulse 76   Ht 5' 7" (1.702 m)   Wt 85.4 kg (188 lb 4.4 oz)   SpO2 98%   BMI 29.49 kg/m²   Body mass index is 29.49 kg/m².  CrCl cannot be calculated (Patient's most recent lab result is older than the maximum 7 days allowed.).    Lab Results   Component Value Date     02/13/2012    K 3.2 (L) 02/13/2012     02/13/2012    CO2 18 (L) 02/13/2012    BUN 16 02/13/2012    CREATININE 0.9 02/13/2012    GLU " 161 (H) 02/13/2012    AST 21 02/13/2012    ALT 12 02/13/2012    ALBUMIN 3.7 02/13/2012    PROT 6.9 02/13/2012    BILITOT 0.4 02/13/2012    WBC 7.18 02/13/2012    HGB 11.9 (L) 02/13/2012    HCT 35.5 (L) 02/13/2012    MCV 90.1 02/13/2012     02/13/2012       Current Outpatient Medications   Medication Sig    CALCIUM 500 WITH D 500 mg(1,250mg) -400 unit per tablet Take by mouth.    estradioL (ESTRACE) 0.01 % (0.1 mg/gram) vaginal cream Place 1 g vaginally twice a week.    ONE-A-DAY ESSENTIAL per tablet Take by mouth.    metoprolol succinate (TOPROL-XL) 25 MG 24 hr tablet Take 1 tablet (25 mg total) by mouth once daily.    rosuvastatin (CRESTOR) 20 MG tablet Take 1 tablet (20 mg total) by mouth nightly.     No current facility-administered medications for this visit.       Review of Systems   Constitutional: Negative for chills, decreased appetite, malaise/fatigue, night sweats, weight gain and weight loss.   Eyes: Negative for blurred vision, double vision, visual disturbance and visual halos.   Cardiovascular: Positive for chest pain and dyspnea on exertion. Negative for claudication, cyanosis, irregular heartbeat, leg swelling, near-syncope, orthopnea, palpitations, paroxysmal nocturnal dyspnea and syncope.   Respiratory: Negative for cough, hemoptysis, snoring, sputum production and wheezing.    Endocrine: Negative for cold intolerance, heat intolerance, polydipsia and polyphagia.   Hematologic/Lymphatic: Negative for adenopathy and bleeding problem. Does not bruise/bleed easily.   Skin: Negative for flushing, itching, poor wound healing and rash.   Musculoskeletal: Negative for arthritis, back pain, falls, gout, joint pain, joint swelling, muscle cramps, muscle weakness, myalgias, neck pain and stiffness.   Gastrointestinal: Negative for bloating, abdominal pain, anorexia, diarrhea, dysphagia, excessive appetite, flatus, hematemesis, jaundice, melena and nausea.   Genitourinary: Negative for hesitancy  and incomplete emptying.   Neurological: Negative for aphonia, brief paralysis, difficulty with concentration, disturbances in coordination, excessive daytime sleepiness, dizziness, focal weakness, light-headedness, loss of balance and weakness.   Psychiatric/Behavioral: Negative for altered mental status, depression, hallucinations, hypervigilance, memory loss, substance abuse and suicidal ideas. The patient does not have insomnia and is not nervous/anxious.        Objective:   Physical Exam  Constitutional:       General: She is not in acute distress.     Appearance: She is well-developed. She is not diaphoretic.   HENT:      Head: Normocephalic and atraumatic.      Nose: Nose normal.      Mouth/Throat:      Pharynx: No oropharyngeal exudate.   Eyes:      General: No scleral icterus.        Right eye: No discharge.         Left eye: No discharge.      Conjunctiva/sclera: Conjunctivae normal.      Pupils: Pupils are equal, round, and reactive to light.   Neck:      Thyroid: No thyromegaly.      Vascular: No JVD.      Trachea: No tracheal deviation.   Cardiovascular:      Rate and Rhythm: Normal rate and regular rhythm.      Pulses: Intact distal pulses.      Heart sounds: Normal heart sounds. No murmur heard.    No friction rub. No gallop.   Pulmonary:      Effort: Pulmonary effort is normal. No respiratory distress.      Breath sounds: Normal breath sounds. No stridor. No wheezing or rales.   Chest:      Chest wall: No tenderness.   Abdominal:      General: Bowel sounds are normal. There is no distension.      Palpations: Abdomen is soft. There is no mass.      Tenderness: There is no abdominal tenderness. There is no guarding or rebound.   Musculoskeletal:         General: No tenderness. Normal range of motion.      Cervical back: Normal range of motion and neck supple.   Lymphadenopathy:      Cervical: No cervical adenopathy.   Skin:     General: Skin is warm.      Coloration: Skin is not pale.      Findings: No  erythema or rash.   Neurological:      Mental Status: She is alert and oriented to person, place, and time.      Cranial Nerves: No cranial nerve deficit.      Motor: No abnormal muscle tone.      Coordination: Coordination normal.      Deep Tendon Reflexes: Reflexes are normal and symmetric.   Psychiatric:         Behavior: Behavior normal.         Thought Content: Thought content normal.         Judgment: Judgment normal.         Assessment:     1. DUNN (dyspnea on exertion)    2. Pacemaker    3. Other chest pain    4. Over weight        Plan:     Chest pressure and DUNN appear non cardiac and may be residual sx of COVID with possible inflammatory disease in the lung, recommend CT chest.   Lucy was seen today for dizziness, shortness of breath and medication refill.    Diagnoses and all orders for this visit:    DUNN (dyspnea on exertion)  -     CT Chest Without Contrast; Future  -     Ambulatory referral/consult to Internal Medicine; Future    Pacemaker    Other chest pain    Over weight    Other orders  -     metoprolol succinate (TOPROL-XL) 25 MG 24 hr tablet; Take 1 tablet (25 mg total) by mouth once daily.  -     rosuvastatin (CRESTOR) 20 MG tablet; Take 1 tablet (20 mg total) by mouth nightly.      RTC 11 wks.

## 2022-07-14 PROBLEM — R07.89 OTHER CHEST PAIN: Status: ACTIVE | Noted: 2022-07-14

## 2022-07-14 PROBLEM — E66.3 OVER WEIGHT: Status: ACTIVE | Noted: 2022-07-14

## 2022-07-14 PROBLEM — R06.09 DOE (DYSPNEA ON EXERTION): Status: ACTIVE | Noted: 2022-07-14

## 2022-07-14 PROBLEM — Z95.0 PACEMAKER: Status: ACTIVE | Noted: 2022-07-14

## 2022-07-27 ENCOUNTER — HOSPITAL ENCOUNTER (OUTPATIENT)
Dept: RADIOLOGY | Facility: HOSPITAL | Age: 67
Discharge: HOME | End: 2022-07-27
Attending: INTERNAL MEDICINE
Payer: MEDICARE

## 2022-07-27 DIAGNOSIS — R06.09 DOE (DYSPNEA ON EXERTION): ICD-10-CM

## 2022-07-27 PROCEDURE — 71250 CT CHEST WITHOUT CONTRAST: ICD-10-PCS | Mod: 26,,, | Performed by: RADIOLOGY

## 2022-07-27 PROCEDURE — 71250 CT THORAX DX C-: CPT | Mod: TC

## 2022-07-27 PROCEDURE — 71250 CT THORAX DX C-: CPT | Mod: 26,,, | Performed by: RADIOLOGY

## 2022-08-25 ENCOUNTER — TELEPHONE (OUTPATIENT)
Dept: OBSTETRICS AND GYNECOLOGY | Facility: CLINIC | Age: 67
End: 2022-08-25
Payer: MEDICARE

## 2022-08-25 NOTE — TELEPHONE ENCOUNTER
----- Message from Rosario Loredo sent at 8/25/2022 11:30 AM CDT -----  Patient is calling she would like to schedule her annual appt. It needs to be after November 1st    Please contact patient at [phone]

## 2022-09-05 ENCOUNTER — CLINICAL SUPPORT (OUTPATIENT)
Dept: CARDIOLOGY | Facility: HOSPITAL | Age: 67
End: 2022-09-05
Payer: MEDICARE

## 2022-09-05 DIAGNOSIS — Z95.0 PRESENCE OF CARDIAC PACEMAKER: ICD-10-CM

## 2022-09-05 PROCEDURE — 93296 REM INTERROG EVL PM/IDS: CPT | Performed by: INTERNAL MEDICINE

## 2022-10-21 ENCOUNTER — OFFICE VISIT (OUTPATIENT)
Dept: URGENT CARE | Facility: CLINIC | Age: 67
End: 2022-10-21
Payer: MEDICARE

## 2022-10-21 VITALS
TEMPERATURE: 97 F | HEART RATE: 81 BPM | WEIGHT: 188 LBS | BODY MASS INDEX: 29.51 KG/M2 | RESPIRATION RATE: 16 BRPM | DIASTOLIC BLOOD PRESSURE: 78 MMHG | OXYGEN SATURATION: 96 % | HEIGHT: 67 IN | SYSTOLIC BLOOD PRESSURE: 120 MMHG

## 2022-10-21 DIAGNOSIS — Z95.0 PACEMAKER: ICD-10-CM

## 2022-10-21 DIAGNOSIS — R53.83 FATIGUE, UNSPECIFIED TYPE: ICD-10-CM

## 2022-10-21 DIAGNOSIS — R05.9 COUGH, UNSPECIFIED TYPE: Primary | ICD-10-CM

## 2022-10-21 LAB
CTP QC/QA: YES
CTP QC/QA: YES
POC MOLECULAR INFLUENZA A AGN: NEGATIVE
POC MOLECULAR INFLUENZA B AGN: NEGATIVE
SARS-COV-2 RDRP RESP QL NAA+PROBE: NEGATIVE

## 2022-10-21 PROCEDURE — 3078F DIAST BP <80 MM HG: CPT | Mod: CPTII,S$GLB,, | Performed by: SURGERY

## 2022-10-21 PROCEDURE — 3074F PR MOST RECENT SYSTOLIC BLOOD PRESSURE < 130 MM HG: ICD-10-PCS | Mod: CPTII,S$GLB,, | Performed by: SURGERY

## 2022-10-21 PROCEDURE — 3078F PR MOST RECENT DIASTOLIC BLOOD PRESSURE < 80 MM HG: ICD-10-PCS | Mod: CPTII,S$GLB,, | Performed by: SURGERY

## 2022-10-21 PROCEDURE — 1159F MED LIST DOCD IN RCRD: CPT | Mod: CPTII,S$GLB,, | Performed by: SURGERY

## 2022-10-21 PROCEDURE — 1159F PR MEDICATION LIST DOCUMENTED IN MEDICAL RECORD: ICD-10-PCS | Mod: CPTII,S$GLB,, | Performed by: SURGERY

## 2022-10-21 PROCEDURE — 87635 SARS-COV-2 COVID-19 AMP PRB: CPT | Mod: QW,S$GLB,, | Performed by: SURGERY

## 2022-10-21 PROCEDURE — 1125F AMNT PAIN NOTED PAIN PRSNT: CPT | Mod: CPTII,S$GLB,, | Performed by: SURGERY

## 2022-10-21 PROCEDURE — 93005 ELECTROCARDIOGRAM TRACING: CPT | Mod: S$GLB,,, | Performed by: SURGERY

## 2022-10-21 PROCEDURE — 87502 POCT INFLUENZA A/B MOLECULAR: ICD-10-PCS | Mod: QW,S$GLB,, | Performed by: SURGERY

## 2022-10-21 PROCEDURE — 1160F RVW MEDS BY RX/DR IN RCRD: CPT | Mod: CPTII,S$GLB,, | Performed by: SURGERY

## 2022-10-21 PROCEDURE — 87502 INFLUENZA DNA AMP PROBE: CPT | Mod: QW,S$GLB,, | Performed by: SURGERY

## 2022-10-21 PROCEDURE — 99214 OFFICE O/P EST MOD 30 MIN: CPT | Mod: S$GLB,,, | Performed by: SURGERY

## 2022-10-21 PROCEDURE — 87635: ICD-10-PCS | Mod: QW,S$GLB,, | Performed by: SURGERY

## 2022-10-21 PROCEDURE — 1160F PR REVIEW ALL MEDS BY PRESCRIBER/CLIN PHARMACIST DOCUMENTED: ICD-10-PCS | Mod: CPTII,S$GLB,, | Performed by: SURGERY

## 2022-10-21 PROCEDURE — 93010 EKG 12-LEAD: ICD-10-PCS | Mod: S$GLB,,, | Performed by: INTERNAL MEDICINE

## 2022-10-21 PROCEDURE — 93010 ELECTROCARDIOGRAM REPORT: CPT | Mod: S$GLB,,, | Performed by: INTERNAL MEDICINE

## 2022-10-21 PROCEDURE — 3074F SYST BP LT 130 MM HG: CPT | Mod: CPTII,S$GLB,, | Performed by: SURGERY

## 2022-10-21 PROCEDURE — 1125F PR PAIN SEVERITY QUANTIFIED, PAIN PRESENT: ICD-10-PCS | Mod: CPTII,S$GLB,, | Performed by: SURGERY

## 2022-10-21 PROCEDURE — 93005 EKG 12-LEAD: ICD-10-PCS | Mod: S$GLB,,, | Performed by: SURGERY

## 2022-10-21 PROCEDURE — 99214 PR OFFICE/OUTPT VISIT, EST, LEVL IV, 30-39 MIN: ICD-10-PCS | Mod: S$GLB,,, | Performed by: SURGERY

## 2022-10-21 NOTE — PROGRESS NOTES
"Subjective:       Patient ID: Lucy Booth is a 67 y.o. female.    Vitals:  height is 5' 7" (1.702 m) and weight is 85.3 kg (188 lb). Her temperature is 97.3 °F (36.3 °C). Her pulse is 81. Her respiration is 16 and oxygen saturation is 96%.     Chief Complaint: Sore Throat (Entered by patient) and Cough    Patient started having diarrhea last night and a head aches  and lower back ache     Sore Throat   This is a new problem. The current episode started yesterday. The problem has been unchanged. The pain is at a severity of 3/10. The pain is mild. Associated symptoms include abdominal pain, coughing, diarrhea, drooling and headaches. Pertinent negatives include no congestion, ear discharge, ear pain, hoarse voice, plugged ear sensation, neck pain, shortness of breath, stridor, swollen glands, trouble swallowing or vomiting.   Cough  Associated symptoms include headaches and a sore throat. Pertinent negatives include no ear pain or shortness of breath.     HENT:  Positive for drooling and sore throat. Negative for ear pain, ear discharge, congestion and trouble swallowing.    Neck: Negative for neck pain.   Respiratory:  Positive for cough. Negative for shortness of breath and stridor.    Gastrointestinal:  Positive for abdominal pain and diarrhea. Negative for vomiting.   Neurological:  Positive for headaches.     Objective:      Physical Exam   Constitutional: She is oriented to person, place, and time. She appears well-developed. She is cooperative.  Non-toxic appearance. She does not appear ill. No distress.   HENT:   Head: Normocephalic and atraumatic.   Ears:   Right Ear: Hearing, tympanic membrane, external ear and ear canal normal.   Left Ear: Hearing, tympanic membrane, external ear and ear canal normal.   Nose: Nose normal. No mucosal edema, rhinorrhea or nasal deformity. No epistaxis. Right sinus exhibits no maxillary sinus tenderness and no frontal sinus tenderness. Left sinus exhibits no maxillary " sinus tenderness and no frontal sinus tenderness.   Mouth/Throat: Uvula is midline, oropharynx is clear and moist and mucous membranes are normal. No trismus in the jaw. Normal dentition. No uvula swelling. No oropharyngeal exudate, posterior oropharyngeal edema or posterior oropharyngeal erythema.   Eyes: Conjunctivae and lids are normal. No scleral icterus.   Neck: Trachea normal and phonation normal. Neck supple. No edema present. No erythema present. No neck rigidity present.   Cardiovascular: Normal rate, regular rhythm, normal heart sounds and normal pulses.   Pulmonary/Chest: Effort normal and breath sounds normal. No respiratory distress. She has no decreased breath sounds. She has no rhonchi.   Abdominal: Normal appearance.   Musculoskeletal: Normal range of motion.         General: No deformity. Normal range of motion.   Neurological: She is alert and oriented to person, place, and time. She exhibits normal muscle tone. Coordination normal.   Skin: Skin is warm, dry, intact, not diaphoretic and not pale.   Psychiatric: Her speech is normal and behavior is normal. Judgment and thought content normal.   Nursing note and vitals reviewed.      Assessment:       1. Cough, unspecified type    2. Fatigue, unspecified type    3. Pacemaker          Plan:         Cough, unspecified type  -     POCT Influenza A/B Molecular  -     POCT COVID-19 Rapid Screening    Fatigue, unspecified type  -     EKG 12-lead    Pacemaker       EKG: Sinus rhythm with few PVC, HR 71, no paced beats.          Results for orders placed or performed in visit on 10/21/22   POCT Influenza A/B Molecular   Result Value Ref Range    POC Molecular Influenza A Ag Negative Negative, Not Reported    POC Molecular Influenza B Ag Negative Negative, Not Reported     Acceptable Yes

## 2022-11-02 ENCOUNTER — OFFICE VISIT (OUTPATIENT)
Dept: OBSTETRICS AND GYNECOLOGY | Facility: CLINIC | Age: 67
End: 2022-11-02
Attending: OBSTETRICS & GYNECOLOGY
Payer: MEDICARE

## 2022-11-02 VITALS
WEIGHT: 188 LBS | SYSTOLIC BLOOD PRESSURE: 115 MMHG | HEIGHT: 67 IN | BODY MASS INDEX: 29.51 KG/M2 | DIASTOLIC BLOOD PRESSURE: 81 MMHG | HEART RATE: 38 BPM

## 2022-11-02 DIAGNOSIS — Z12.31 ENCOUNTER FOR SCREENING MAMMOGRAM FOR MALIGNANT NEOPLASM OF BREAST: Primary | ICD-10-CM

## 2022-11-02 DIAGNOSIS — Z01.419 ENCOUNTER FOR GYNECOLOGICAL EXAMINATION WITHOUT ABNORMAL FINDING: ICD-10-CM

## 2022-11-02 DIAGNOSIS — N90.89 VULVAR IRRITATION: ICD-10-CM

## 2022-11-02 PROCEDURE — 3074F SYST BP LT 130 MM HG: CPT | Mod: CPTII,S$GLB,, | Performed by: OBSTETRICS & GYNECOLOGY

## 2022-11-02 PROCEDURE — 1101F PT FALLS ASSESS-DOCD LE1/YR: CPT | Mod: CPTII,S$GLB,, | Performed by: OBSTETRICS & GYNECOLOGY

## 2022-11-02 PROCEDURE — 3079F PR MOST RECENT DIASTOLIC BLOOD PRESSURE 80-89 MM HG: ICD-10-PCS | Mod: CPTII,S$GLB,, | Performed by: OBSTETRICS & GYNECOLOGY

## 2022-11-02 PROCEDURE — G0101 PR CA SCREEN;PELVIC/BREAST EXAM: ICD-10-PCS | Mod: S$GLB,,, | Performed by: OBSTETRICS & GYNECOLOGY

## 2022-11-02 PROCEDURE — 3008F BODY MASS INDEX DOCD: CPT | Mod: CPTII,S$GLB,, | Performed by: OBSTETRICS & GYNECOLOGY

## 2022-11-02 PROCEDURE — 1159F PR MEDICATION LIST DOCUMENTED IN MEDICAL RECORD: ICD-10-PCS | Mod: CPTII,S$GLB,, | Performed by: OBSTETRICS & GYNECOLOGY

## 2022-11-02 PROCEDURE — 3079F DIAST BP 80-89 MM HG: CPT | Mod: CPTII,S$GLB,, | Performed by: OBSTETRICS & GYNECOLOGY

## 2022-11-02 PROCEDURE — 3008F PR BODY MASS INDEX (BMI) DOCUMENTED: ICD-10-PCS | Mod: CPTII,S$GLB,, | Performed by: OBSTETRICS & GYNECOLOGY

## 2022-11-02 PROCEDURE — 1126F AMNT PAIN NOTED NONE PRSNT: CPT | Mod: CPTII,S$GLB,, | Performed by: OBSTETRICS & GYNECOLOGY

## 2022-11-02 PROCEDURE — 1126F PR PAIN SEVERITY QUANTIFIED, NO PAIN PRESENT: ICD-10-PCS | Mod: CPTII,S$GLB,, | Performed by: OBSTETRICS & GYNECOLOGY

## 2022-11-02 PROCEDURE — 3288F PR FALLS RISK ASSESSMENT DOCUMENTED: ICD-10-PCS | Mod: CPTII,S$GLB,, | Performed by: OBSTETRICS & GYNECOLOGY

## 2022-11-02 PROCEDURE — 3074F PR MOST RECENT SYSTOLIC BLOOD PRESSURE < 130 MM HG: ICD-10-PCS | Mod: CPTII,S$GLB,, | Performed by: OBSTETRICS & GYNECOLOGY

## 2022-11-02 PROCEDURE — 3288F FALL RISK ASSESSMENT DOCD: CPT | Mod: CPTII,S$GLB,, | Performed by: OBSTETRICS & GYNECOLOGY

## 2022-11-02 PROCEDURE — G0101 CA SCREEN;PELVIC/BREAST EXAM: HCPCS | Mod: S$GLB,,, | Performed by: OBSTETRICS & GYNECOLOGY

## 2022-11-02 PROCEDURE — 1101F PR PT FALLS ASSESS DOC 0-1 FALLS W/OUT INJ PAST YR: ICD-10-PCS | Mod: CPTII,S$GLB,, | Performed by: OBSTETRICS & GYNECOLOGY

## 2022-11-02 PROCEDURE — 1159F MED LIST DOCD IN RCRD: CPT | Mod: CPTII,S$GLB,, | Performed by: OBSTETRICS & GYNECOLOGY

## 2022-11-02 PROCEDURE — 99999 PR PBB SHADOW E&M-EST. PATIENT-LVL III: CPT | Mod: PBBFAC,,, | Performed by: OBSTETRICS & GYNECOLOGY

## 2022-11-02 PROCEDURE — 99999 PR PBB SHADOW E&M-EST. PATIENT-LVL III: ICD-10-PCS | Mod: PBBFAC,,, | Performed by: OBSTETRICS & GYNECOLOGY

## 2022-11-02 NOTE — PROGRESS NOTES
SUBJECTIVE:   67 y.o. female   for annual routine Pap and checkup. No LMP recorded. Patient has had a hysterectomy..  She reports anal and vaginal itching for months. She does have a history of hemorrhoids   She reprots using hemorrhoid cream, neosporin and Vagisil with no improvement.        Past Medical History:   Diagnosis Date    Abnormal Pap smear     Abnormal Pap smear of cervix     Fibrocystic breast      Past Surgical History:   Procedure Laterality Date    BLADDER SUSPENSION      COLPOSCOPY      HYSTERECTOMY      INSERTION OF PACEMAKER  2021    [facility]    LAPAROSCOPIC HYSTERECTOMY      AUB     Social History     Socioeconomic History    Marital status:    Tobacco Use    Smoking status: Former    Smokeless tobacco: Former   Substance and Sexual Activity    Alcohol use: Yes     Comment: Socially    Drug use: No    Sexual activity: Not Currently     Birth control/protection: Surgical     Comment:      Family History   Problem Relation Age of Onset    Breast cancer Paternal Grandmother     Heart disease Mother     Colon cancer Neg Hx     Ovarian cancer Neg Hx     Cancer Neg Hx      OB History    Para Term  AB Living   3 3       3   SAB IAB Ectopic Multiple Live Births           3      # Outcome Date GA Lbr Lit/2nd Weight Sex Delivery Anes PTL Lv   3 Para      Vag-Spont   STEVEN   2 Para      Vag-Spont   STEVEN   1 Para      Vag-Spont   STEVEN           Current Outpatient Medications   Medication Sig Dispense Refill    estradioL (ESTRACE) 0.01 % (0.1 mg/gram) vaginal cream Place 1 g vaginally twice a week. 42.5 g 11    metoprolol succinate (TOPROL-XL) 25 MG 24 hr tablet Take 1 tablet (25 mg total) by mouth once daily. 90 tablet 3    ONE-A-DAY ESSENTIAL per tablet Take by mouth.      rosuvastatin (CRESTOR) 20 MG tablet Take 1 tablet (20 mg total) by mouth nightly. 90 tablet 3     No current facility-administered medications for this visit.     Allergies: Patient has no  known allergies.     The ASCVD Risk score (Coleman MATTA, et al., 2019) failed to calculate for the following reasons:    Cannot find a previous HDL lab    Cannot find a previous total cholesterol lab      ROS:  Constitutional: no weight loss, weight gain, fever, fatigue  Eyes:  No vision changes, glasses/contacts  ENT/Mouth: No ulcers, sinus problems, ears ringing, headache  Cardiovascular: No inability to lie flat, chest pain, exercise intolerance, swelling, heart palpitations  Respiratory: No wheezing, coughing blood, shortness of breath, or cough  Gastrointestinal: No diarrhea, bloody stool, nausea/vomiting, constipation, gas, hemorrhoids  Genitourinary: No blood in urine, painful urination, urgency of urination, frequency of urination, incomplete emptying, incontinence, abnormal bleeding, painful periods, heavy periods, vaginal discharge, vaginal odor, painful intercourse, sexual problems, bleeding after intercourse.  Musculoskeletal: No muscle weakness  Skin/Breast: No painful breasts, nipple discharge, masses, rash, ulcers  Neurological: No passing out, seizures, numbness, headache  Endocrine: No diabetes, hypothyroid, hyperthyroid, hot flashes, hair loss, abnormal hair growth, acne  Psychiatric: No depression, crying  Hematologic: No bruises, bleeding, swollen lymph nodes, anemia.      Physical Exam:   Constitutional: She is oriented to person, place, and time. She appears well-developed and well-nourished.      Neck: No tracheal deviation present. No thyromegaly present.    Cardiovascular:       Exam reveals no edema.        Pulmonary/Chest: Effort normal. She exhibits no mass, no tenderness, no deformity and no retraction. Right breast exhibits no inverted nipple, no mass, no nipple discharge, no skin change, no tenderness, presence, no bleeding and no swelling. Left breast exhibits no inverted nipple, no mass, no nipple discharge, no skin change, no tenderness, presence, no bleeding and no swelling. Breasts  are symmetrical.        Abdominal: Soft. She exhibits no distension and no mass. There is no abdominal tenderness. There is no rebound and no guarding. No hernia. Hernia confirmed negative in the left inguinal area.     Genitourinary: Rectum:      No external hemorrhoid.   There is no rash, tenderness or lesion on the right labia. There is no rash, tenderness or lesion on the left labia. No no adexnal prolapse. Right adnexum displays no mass, no tenderness and no fullness. Left adnexum displays no mass, no tenderness and no fullness. Vaginal cuff normal.  No  no vaginal discharge, tenderness, bleeding, rectocele, cystocele or unspecified prolapse of vaginal walls in the vagina. Cervix is absent.Uterus is absent.           Musculoskeletal: Normal range of motion and moves all extremeties. No edema.       Neurological: She is alert and oriented to person, place, and time.    Skin: No rash noted. No erythema. No pallor.    Psychiatric: She has a normal mood and affect. Her behavior is normal. Judgment and thought content normal.     +white plaques near anus and labia and urethra  +atrophy  ASSESSMENT:   well woman  Vulvar itching  Vulvar lesion  PLAN:   Mammogram  Patient to return to clinic for vulvar biopsy  return annually or prn

## 2022-11-03 ENCOUNTER — PROCEDURE VISIT (OUTPATIENT)
Dept: OBSTETRICS AND GYNECOLOGY | Facility: CLINIC | Age: 67
End: 2022-11-03
Attending: OBSTETRICS & GYNECOLOGY
Payer: MEDICARE

## 2022-11-03 ENCOUNTER — TELEPHONE (OUTPATIENT)
Dept: ELECTROPHYSIOLOGY | Facility: CLINIC | Age: 67
End: 2022-11-03
Payer: MEDICARE

## 2022-11-03 VITALS
SYSTOLIC BLOOD PRESSURE: 105 MMHG | WEIGHT: 188 LBS | DIASTOLIC BLOOD PRESSURE: 52 MMHG | HEIGHT: 67 IN | BODY MASS INDEX: 29.51 KG/M2 | HEART RATE: 45 BPM

## 2022-11-03 DIAGNOSIS — N90.89 VULVAL LESION: Primary | ICD-10-CM

## 2022-11-03 PROCEDURE — 88312 PR  SPECIAL STAINS,GROUP I: ICD-10-PCS | Mod: 26,,, | Performed by: PATHOLOGY

## 2022-11-03 PROCEDURE — 57100 BIOPSY (GYNECOLOGICAL): ICD-10-PCS | Mod: S$GLB,,, | Performed by: OBSTETRICS & GYNECOLOGY

## 2022-11-03 PROCEDURE — 57100 BIOPSY VAGINAL MUCOSA SIMPLE: CPT | Mod: S$GLB,,, | Performed by: OBSTETRICS & GYNECOLOGY

## 2022-11-03 PROCEDURE — 88305 TISSUE EXAM BY PATHOLOGIST: ICD-10-PCS | Mod: 26,,, | Performed by: PATHOLOGY

## 2022-11-03 PROCEDURE — 88312 SPECIAL STAINS GROUP 1: CPT | Performed by: PATHOLOGY

## 2022-11-03 PROCEDURE — 88305 TISSUE EXAM BY PATHOLOGIST: CPT | Mod: 26,,, | Performed by: PATHOLOGY

## 2022-11-03 PROCEDURE — 88312 SPECIAL STAINS GROUP 1: CPT | Mod: 26,,, | Performed by: PATHOLOGY

## 2022-11-03 PROCEDURE — 88305 TISSUE EXAM BY PATHOLOGIST: CPT | Performed by: PATHOLOGY

## 2022-11-03 NOTE — TELEPHONE ENCOUNTER
"I spoke with Ms. Booth who said that her heart rate was low and her blood pressure was low today in her doctor's office and she "just doesn't feel right". She doesn't know what her heart rate was but said that her BP was around 105/60. I let her know her pacemaker will keep her heart rate from dropping below a certain threshold. I also let her know that she may need to reach out to her general cardiologist regarding her blood pressure if it continues to read low and she is symptomatic. Also, I explained that I would send a message to the device team to check and make sure that her pacemaker is functioning normally. She verbalized understanding and appreciated the call.   "

## 2022-11-03 NOTE — TELEPHONE ENCOUNTER
----- Message from Greg lFoyd MA sent at 11/3/2022  4:15 PM CDT -----  Regarding: FW: please call    ----- Message -----  From: Polina Padilla  Sent: 11/3/2022   3:37 PM CDT  To: [facility] Staff  Subject: please call                                      The pt is calling to report that she feels really weird. Please call her back@ [phone]. Thanks, Polina

## 2022-11-03 NOTE — PROCEDURES
Biopsy (Gynecological)    Date/Time: 11/3/2022 2:15 PM  Performed by: Isabela Saleh MD  Authorized by: Isabela Saleh MD     Consent Done?:  Yes (Written)   Patient was prepped and draped in the normal sterile fashion.  Local anesthesia used?: No    Anesthesia:  Local infiltration  Local anesthetic:  Lidocaine 1% without epinephrine  Anesthetic total (ml):  2.5    Biopsy Location:  Vagina  Vagina:     Complexity:  Simple    Site:  Anterior  Estimated blood loss (cc):  5   Patient tolerated the procedure well with no immediate complications.     Excellent hemostasis with silver nitrate.  White plaques consistent with lichen sclerosus. 3mm punch on right aspect of introitus

## 2022-11-07 RX ORDER — CLOBETASOL PROPIONATE 0.5 MG/G
OINTMENT TOPICAL 2 TIMES DAILY
Qty: 45 G | Refills: 3 | Status: SHIPPED | OUTPATIENT
Start: 2022-11-07 | End: 2023-11-08

## 2022-11-07 NOTE — TELEPHONE ENCOUNTER
----- Message from Nicole Sagastume sent at 11/7/2022  1:45 PM CST -----  Pt is calling to get the status of a RX that was supposed to be called in to [facility]. Pt can be reached at [phone]

## 2022-11-07 NOTE — PROGRESS NOTES
Subjective:    Patient ID:  Lucy Booth is a 67 y.o. female who presents for follow-up of Pacemaker Check      67 yoF here for PM management.     3/22: She had Dc PM (Bio) placed for nocturnal AV block 8/21. Per records patient had fatigue. On event monitor had second degree type 2 while sleeping and then during day had pulse oxs rates in 40s. 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 Patient states that she has had chest tightness with any exercise. She attributes change in symptoms to her PPM. Patient also notes this with climbing stairs. She does not have palpitations, does not feel fluttering. States clearly that it is a tightness in the center of her chest associated with exercise.     Metoprolol started for PACs    Interval history:  Recent dizziness. 21% PVC burden. On metoprolol. Stress test with no ischemia    Stress echo 3/22:  · The patient exercised for 6 minutes and 15 seconds on a high ramp protocol, corresponding to a functional capacity of 10 METS, achieving a peak heart rate of 136 bpm, which is 91% of the age predicted maximum heart rate.  · The test was stopped because the patient experienced fatigue.  · The patient's exercise capacity was normal.  · During stress, the following significant arrhythmias were observed: occasional PACs.  · The ECG portion of this study is negative for myocardial ischemia.  · The left ventricle is normal in size with normal systolic function. The estimated ejection fraction is 55%. Normal left ventricular diastolic function.  · Normal right ventricular size with normal right ventricular systolic function.  · The estimated PA systolic pressure is 29 mmHg.  · Normal central venous pressure (3 mmHg).  · The stress echo portion of this study is negative for myocardial ischemia.      Past Medical History:  No date: Abnormal Pap smear  No date: Abnormal Pap smear of cervix  No date: Fibrocystic breast    Past Surgical History:  No date: BLADDER  SUSPENSION  No date: COLPOSCOPY  No date: HYSTERECTOMY  08/2021: INSERTION OF PACEMAKER      Comment:  [facility]  No date: LAPAROSCOPIC HYSTERECTOMY      Comment:  AUB    Social History    Socioeconomic History      Marital status:     Tobacco Use      Smoking status: Former      Smokeless tobacco: Former    Substance and Sexual Activity      Alcohol use: Yes        Comment: Socially      Drug use: No      Sexual activity: Not Currently        Birth control/protection: Surgical        Comment:       Review of patient's family history indicates:    Current Outpatient Medications:  estradioL (ESTRACE) 0.01 % (0.1 mg/gram) vaginal cream, Place 1 g vaginally twice a week., Disp: 42.5 g, Rfl: 11  metoprolol succinate (TOPROL-XL) 25 MG 24 hr tablet, Take 1 tablet (25 mg total) by mouth once daily., Disp: 90 tablet, Rfl: 3  ONE-A-DAY ESSENTIAL per tablet, Take by mouth., Disp: , Rfl:   rosuvastatin (CRESTOR) 20 MG tablet, Take 1 tablet (20 mg total) by mouth nightly., Disp: 90 tablet, Rfl: 3    No current facility-administered medications for this visit.              Review of Systems   Constitutional: Negative.   HENT: Negative.     Eyes: Negative.    Cardiovascular: Negative.    Respiratory: Negative.     Endocrine: Negative.    Hematologic/Lymphatic: Negative.    Skin: Negative.    Musculoskeletal: Negative.    Gastrointestinal: Negative.    Genitourinary: Negative.    Neurological:  Positive for dizziness and light-headedness.   Psychiatric/Behavioral: Negative.     Allergic/Immunologic: Negative.       Objective:    Physical Exam  Vitals reviewed.   Constitutional:       Appearance: Normal appearance. She is normal weight.   HENT:      Head: Normocephalic and atraumatic.      Right Ear: External ear normal.      Left Ear: External ear normal.      Nose: Nose normal.      Mouth/Throat:      Mouth: Mucous membranes are moist.      Pharynx: Oropharynx is clear.   Eyes:      Extraocular  Movements: Extraocular movements intact.      Conjunctiva/sclera: Conjunctivae normal.      Pupils: Pupils are equal, round, and reactive to light.   Cardiovascular:      Rate and Rhythm: Normal rate. Rhythm irregular.      Pulses: Normal pulses.      Heart sounds: Normal heart sounds.   Pulmonary:      Effort: Pulmonary effort is normal.      Breath sounds: Normal breath sounds.   Abdominal:      General: Abdomen is flat. Bowel sounds are normal.      Palpations: Abdomen is soft.   Musculoskeletal:         General: Normal range of motion.      Cervical back: Normal range of motion and neck supple.   Skin:     General: Skin is warm.      Capillary Refill: Capillary refill takes less than 2 seconds.   Neurological:      General: No focal deficit present.      Mental Status: She is alert and oriented to person, place, and time. Mental status is at baseline.   Psychiatric:         Mood and Affect: Mood normal.         Assessment:       1. Pacemaker    2. Premature atrial complexes    3. Premature atrial contraction         Plan:       67 yoF here for arrhythmia management. She has dizziness and lightheadedness as well as headaches. This may be due to PACs but not certain. Would try flecainide 100 mg bid. RTC 6w

## 2022-11-08 ENCOUNTER — HOSPITAL ENCOUNTER (OUTPATIENT)
Dept: CARDIOLOGY | Facility: CLINIC | Age: 67
Discharge: HOME | End: 2022-11-08
Payer: MEDICARE

## 2022-11-08 ENCOUNTER — OFFICE VISIT (OUTPATIENT)
Dept: ELECTROPHYSIOLOGY | Facility: CLINIC | Age: 67
End: 2022-11-08
Payer: MEDICARE

## 2022-11-08 ENCOUNTER — CLINICAL SUPPORT (OUTPATIENT)
Dept: CARDIOLOGY | Facility: HOSPITAL | Age: 67
End: 2022-11-08
Attending: INTERNAL MEDICINE
Payer: MEDICARE

## 2022-11-08 VITALS
WEIGHT: 188.5 LBS | DIASTOLIC BLOOD PRESSURE: 80 MMHG | SYSTOLIC BLOOD PRESSURE: 130 MMHG | BODY MASS INDEX: 29.58 KG/M2 | HEIGHT: 67 IN | HEART RATE: 72 BPM

## 2022-11-08 DIAGNOSIS — Z95.0 CARDIAC PACEMAKER IN SITU: ICD-10-CM

## 2022-11-08 DIAGNOSIS — Z95.0 PACEMAKER: Primary | ICD-10-CM

## 2022-11-08 DIAGNOSIS — R00.1 BRADYCARDIA: ICD-10-CM

## 2022-11-08 DIAGNOSIS — I49.1 PREMATURE ATRIAL CONTRACTION: ICD-10-CM

## 2022-11-08 DIAGNOSIS — I49.1 PREMATURE ATRIAL COMPLEXES: ICD-10-CM

## 2022-11-08 PROCEDURE — 93010 ELECTROCARDIOGRAM REPORT: CPT | Mod: S$GLB,,, | Performed by: INTERNAL MEDICINE

## 2022-11-08 PROCEDURE — 93005 RHYTHM STRIP: ICD-10-PCS | Mod: S$GLB,,, | Performed by: INTERNAL MEDICINE

## 2022-11-08 PROCEDURE — 99214 PR OFFICE/OUTPT VISIT, EST, LEVL IV, 30-39 MIN: ICD-10-PCS | Mod: S$GLB,,, | Performed by: INTERNAL MEDICINE

## 2022-11-08 PROCEDURE — 1126F AMNT PAIN NOTED NONE PRSNT: CPT | Mod: CPTII,S$GLB,, | Performed by: INTERNAL MEDICINE

## 2022-11-08 PROCEDURE — 93280 PM DEVICE PROGR EVAL DUAL: CPT | Mod: 26,,, | Performed by: INTERNAL MEDICINE

## 2022-11-08 PROCEDURE — 1159F PR MEDICATION LIST DOCUMENTED IN MEDICAL RECORD: ICD-10-PCS | Mod: CPTII,S$GLB,, | Performed by: INTERNAL MEDICINE

## 2022-11-08 PROCEDURE — 3288F FALL RISK ASSESSMENT DOCD: CPT | Mod: CPTII,S$GLB,, | Performed by: INTERNAL MEDICINE

## 2022-11-08 PROCEDURE — 3079F DIAST BP 80-89 MM HG: CPT | Mod: CPTII,S$GLB,, | Performed by: INTERNAL MEDICINE

## 2022-11-08 PROCEDURE — 3008F BODY MASS INDEX DOCD: CPT | Mod: CPTII,S$GLB,, | Performed by: INTERNAL MEDICINE

## 2022-11-08 PROCEDURE — 3075F PR MOST RECENT SYSTOLIC BLOOD PRESS GE 130-139MM HG: ICD-10-PCS | Mod: CPTII,S$GLB,, | Performed by: INTERNAL MEDICINE

## 2022-11-08 PROCEDURE — 93005 ELECTROCARDIOGRAM TRACING: CPT | Mod: S$GLB,,, | Performed by: INTERNAL MEDICINE

## 2022-11-08 PROCEDURE — 3288F PR FALLS RISK ASSESSMENT DOCUMENTED: ICD-10-PCS | Mod: CPTII,S$GLB,, | Performed by: INTERNAL MEDICINE

## 2022-11-08 PROCEDURE — 99999 PR PBB SHADOW E&M-EST. PATIENT-LVL III: CPT | Mod: PBBFAC,,, | Performed by: INTERNAL MEDICINE

## 2022-11-08 PROCEDURE — 3008F PR BODY MASS INDEX (BMI) DOCUMENTED: ICD-10-PCS | Mod: CPTII,S$GLB,, | Performed by: INTERNAL MEDICINE

## 2022-11-08 PROCEDURE — 93280 CARDIAC DEVICE CHECK - IN CLINIC & HOSPITAL: ICD-10-PCS | Mod: 26,,, | Performed by: INTERNAL MEDICINE

## 2022-11-08 PROCEDURE — 1126F PR PAIN SEVERITY QUANTIFIED, NO PAIN PRESENT: ICD-10-PCS | Mod: CPTII,S$GLB,, | Performed by: INTERNAL MEDICINE

## 2022-11-08 PROCEDURE — 3079F PR MOST RECENT DIASTOLIC BLOOD PRESSURE 80-89 MM HG: ICD-10-PCS | Mod: CPTII,S$GLB,, | Performed by: INTERNAL MEDICINE

## 2022-11-08 PROCEDURE — 99214 OFFICE O/P EST MOD 30 MIN: CPT | Mod: S$GLB,,, | Performed by: INTERNAL MEDICINE

## 2022-11-08 PROCEDURE — 99999 PR PBB SHADOW E&M-EST. PATIENT-LVL III: ICD-10-PCS | Mod: PBBFAC,,, | Performed by: INTERNAL MEDICINE

## 2022-11-08 PROCEDURE — 1159F MED LIST DOCD IN RCRD: CPT | Mod: CPTII,S$GLB,, | Performed by: INTERNAL MEDICINE

## 2022-11-08 PROCEDURE — 1101F PR PT FALLS ASSESS DOC 0-1 FALLS W/OUT INJ PAST YR: ICD-10-PCS | Mod: CPTII,S$GLB,, | Performed by: INTERNAL MEDICINE

## 2022-11-08 PROCEDURE — 93280 PM DEVICE PROGR EVAL DUAL: CPT

## 2022-11-08 PROCEDURE — 3075F SYST BP GE 130 - 139MM HG: CPT | Mod: CPTII,S$GLB,, | Performed by: INTERNAL MEDICINE

## 2022-11-08 PROCEDURE — 1101F PT FALLS ASSESS-DOCD LE1/YR: CPT | Mod: CPTII,S$GLB,, | Performed by: INTERNAL MEDICINE

## 2022-11-08 PROCEDURE — 93010 RHYTHM STRIP: ICD-10-PCS | Mod: S$GLB,,, | Performed by: INTERNAL MEDICINE

## 2022-11-08 RX ORDER — FLECAINIDE ACETATE 100 MG/1
100 TABLET ORAL EVERY 12 HOURS
Qty: 60 TABLET | Refills: 11 | Status: SHIPPED | OUTPATIENT
Start: 2022-11-08 | End: 2023-10-13

## 2022-11-11 LAB
FINAL PATHOLOGIC DIAGNOSIS: NORMAL
GROSS: NORMAL
Lab: NORMAL
MICROSCOPIC EXAM: NORMAL

## 2022-11-29 ENCOUNTER — HOSPITAL ENCOUNTER (OUTPATIENT)
Dept: RADIOLOGY | Facility: OTHER | Age: 67
Discharge: HOME | End: 2022-11-29
Attending: OBSTETRICS & GYNECOLOGY
Payer: MEDICARE

## 2022-11-29 DIAGNOSIS — Z12.31 ENCOUNTER FOR SCREENING MAMMOGRAM FOR MALIGNANT NEOPLASM OF BREAST: ICD-10-CM

## 2022-11-29 PROCEDURE — 77063 BREAST TOMOSYNTHESIS BI: CPT | Mod: 26,,, | Performed by: RADIOLOGY

## 2022-11-29 PROCEDURE — 77067 SCR MAMMO BI INCL CAD: CPT | Mod: 26,,, | Performed by: RADIOLOGY

## 2022-11-29 PROCEDURE — 77063 MAMMO DIGITAL SCREENING BILAT WITH TOMO: ICD-10-PCS | Mod: 26,,, | Performed by: RADIOLOGY

## 2022-11-29 PROCEDURE — 77067 MAMMO DIGITAL SCREENING BILAT WITH TOMO: ICD-10-PCS | Mod: 26,,, | Performed by: RADIOLOGY

## 2022-11-29 PROCEDURE — 77063 BREAST TOMOSYNTHESIS BI: CPT | Mod: TC

## 2022-11-29 PROCEDURE — 77067 SCR MAMMO BI INCL CAD: CPT | Mod: TC

## 2022-12-04 ENCOUNTER — CLINICAL SUPPORT (OUTPATIENT)
Dept: CARDIOLOGY | Facility: HOSPITAL | Age: 67
End: 2022-12-04
Payer: MEDICARE

## 2022-12-04 DIAGNOSIS — Z95.0 PRESENCE OF CARDIAC PACEMAKER: ICD-10-CM

## 2022-12-04 PROCEDURE — 93294 CARDIAC DEVICE CHECK - REMOTE: ICD-10-PCS | Mod: ,,, | Performed by: INTERNAL MEDICINE

## 2022-12-04 PROCEDURE — 93296 REM INTERROG EVL PM/IDS: CPT | Performed by: INTERNAL MEDICINE

## 2022-12-04 PROCEDURE — 93294 REM INTERROG EVL PM/LDLS PM: CPT | Mod: ,,, | Performed by: INTERNAL MEDICINE

## 2022-12-13 ENCOUNTER — HOSPITAL ENCOUNTER (OUTPATIENT)
Dept: CARDIOLOGY | Facility: OTHER | Age: 67
Discharge: HOME | End: 2022-12-13
Attending: OBSTETRICS & GYNECOLOGY
Payer: MEDICARE

## 2022-12-13 DIAGNOSIS — R00.1 BRADYCARDIA: ICD-10-CM

## 2022-12-13 DIAGNOSIS — Z95.0 CARDIAC PACEMAKER IN SITU: ICD-10-CM

## 2022-12-13 PROCEDURE — 93010 RHYTHM STRIP: ICD-10-PCS | Mod: ,,, | Performed by: INTERNAL MEDICINE

## 2022-12-13 PROCEDURE — 93005 ELECTROCARDIOGRAM TRACING: CPT

## 2022-12-13 PROCEDURE — 93010 ELECTROCARDIOGRAM REPORT: CPT | Mod: ,,, | Performed by: INTERNAL MEDICINE

## 2022-12-20 ENCOUNTER — OFFICE VISIT (OUTPATIENT)
Dept: ELECTROPHYSIOLOGY | Facility: CLINIC | Age: 67
End: 2022-12-20
Payer: MEDICARE

## 2022-12-20 DIAGNOSIS — I49.8 OTHER CARDIAC ARRHYTHMIA: Primary | ICD-10-CM

## 2022-12-20 DIAGNOSIS — I49.1 PREMATURE ATRIAL CONTRACTION: ICD-10-CM

## 2022-12-20 DIAGNOSIS — Z95.0 PACEMAKER: Primary | ICD-10-CM

## 2022-12-20 PROCEDURE — 99213 PR OFFICE/OUTPT VISIT, EST, LEVL III, 20-29 MIN: ICD-10-PCS | Mod: 95,,, | Performed by: INTERNAL MEDICINE

## 2022-12-20 PROCEDURE — 99213 OFFICE O/P EST LOW 20 MIN: CPT | Mod: 95,,, | Performed by: INTERNAL MEDICINE

## 2022-12-20 NOTE — Clinical Note
Can we get a remote or in person check on her device. Looking to see if her PVC burden (21% in November), has improved on medication. thanks

## 2022-12-20 NOTE — PROGRESS NOTES
Subjective:    Patient ID:  Lucy Booth is a 67 y.o. female who presents for follow-up of PVCs      The patient location is: Home  The chief complaint leading to consultation is: PVCs/PACs  Visit type: Virtual visit with synchronous audio and video  Total time spent with patient: 15  Each patient to whom he or she provides medical services by telemedicine is:  (1) informed of the relationship between the physician and patient and the respective role of any other health care provider with respect to management of the patient; and (2) notified that he or she may decline to receive medical services by telemedicine and may withdraw from such care at any time.    Notes:   67 yoF here for PM management.     3/22: She had Dc PM (Bio) placed for nocturnal AV block 8/21. Per records patient had fatigue. On event monitor had second degree type 2 while sleeping and then during day had pulse oxs rates in 40s. 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 Patient states that she has had chest tightness with any exercise. She attributes change in symptoms to her PPM. Patient also notes this with climbing stairs. She does not have palpitations, does not feel fluttering. States clearly that it is a tightness in the center of her chest associated with exercise.     Metoprolol started for PACs    11/22: Recent dizziness. 21% PVC burden. On metoprolol. Stress test with no ischemia    Flecainide 100 mg bid started    Interval history:  ECG 12/13/22 normal. Flecainide started. Some headaches, resolved. Chest pressure present. Dizziness has improved.     Stress echo 3/22:  · The patient exercised for 6 minutes and 15 seconds on a high ramp protocol, corresponding to a functional capacity of 10 METS, achieving a peak heart rate of 136 bpm, which is 91% of the age predicted maximum heart rate.  · The test was stopped because the patient experienced fatigue.  · The patient's exercise capacity was normal.  · During  stress, the following significant arrhythmias were observed: occasional PACs.  · The ECG portion of this study is negative for myocardial ischemia.  · The left ventricle is normal in size with normal systolic function. The estimated ejection fraction is 55%. Normal left ventricular diastolic function.  · Normal right ventricular size with normal right ventricular systolic function.  · The estimated PA systolic pressure is 29 mmHg.  · Normal central venous pressure (3 mmHg).  · The stress echo portion of this study is negative for myocardial ischemia.    Past Medical History:  No date: Abnormal Pap smear  No date: Abnormal Pap smear of cervix  No date: Fibrocystic breast    Past Surgical History:  No date: BLADDER SUSPENSION  No date: COLPOSCOPY  No date: HYSTERECTOMY  08/2021: INSERTION OF PACEMAKER      Comment:  [facility]  No date: LAPAROSCOPIC HYSTERECTOMY      Comment:  AUB    Social History    Socioeconomic History      Marital status:     Tobacco Use      Smoking status: Former      Smokeless tobacco: Former    Substance and Sexual Activity      Alcohol use: Yes        Comment: Socially      Drug use: No      Sexual activity: Not Currently        Birth control/protection: Surgical        Comment:     Review of patient's family history indicates:    Current Outpatient Medications:  clobetasol 0.05% (TEMOVATE) 0.05 % Oint, Apply topically 2 (two) times daily. Apply 2 times daily for 1 week then daily for 1 week then twice weekly (Patient not taking: Reported on 11/8/2022), Disp: 45 g, Rfl: 3  estradioL (ESTRACE) 0.01 % (0.1 mg/gram) vaginal cream, Place 1 g vaginally twice a week., Disp: 42.5 g, Rfl: 11  flecainide (TAMBOCOR) 100 MG Tab, Take 1 tablet (100 mg total) by mouth every 12 (twelve) hours., Disp: 60 tablet, Rfl: 11  ONE-A-DAY ESSENTIAL per tablet, Take by mouth., Disp: , Rfl:   rosuvastatin (CRESTOR) 20 MG tablet, Take 1 tablet (20 mg total) by mouth nightly., Disp: 90 tablet,  Rfl: 3    No current facility-administered medications for this visit.            Review of Systems   Constitutional: Negative.   HENT: Negative.     Eyes: Negative.    Cardiovascular: Negative.    Respiratory: Negative.     Endocrine: Negative.    Hematologic/Lymphatic: Negative.    Skin: Negative.    Musculoskeletal: Negative.    Gastrointestinal: Negative.    Genitourinary: Negative.    Neurological:  Positive for dizziness and light-headedness.   Psychiatric/Behavioral: Negative.     Allergic/Immunologic: Negative.       Objective:    Physical Exam  ECG 12/13/22: AP rhythm        Assessment:       1. Pacemaker    2. Premature atrial contraction         Plan:       67 yoF here follow up after starting flecainide. Symptoms improved. Will get device checked to see if that correlated with less PVC burden. RTC 3m

## 2022-12-29 ENCOUNTER — TELEPHONE (OUTPATIENT)
Dept: ELECTROPHYSIOLOGY | Facility: CLINIC | Age: 67
End: 2022-12-29
Payer: MEDICARE

## 2022-12-29 NOTE — TELEPHONE ENCOUNTER
I spoke with Ms. Booth and relayed Dr. Martin's message regarding her reduced PVCs and the plan to continue Flecainide. She verbalized understanding and appreciated the call.

## 2022-12-29 NOTE — TELEPHONE ENCOUNTER
----- Message from Isrrael Martin MD sent at 12/20/2022  6:33 PM CST -----  Please contact Mrs Booth and let her know that her PVC burden has markedly been reduced with flecainide. We will continue this therapy  ----- Message -----  From: Rodri Aguila  Sent: 12/20/2022   1:24 PM CST  To: Isrrael Martin MD    Hi Dr. Martin,    Pt has a [email or web address] Device and her remote monitor does nightly readings.  On the transmission that was received last night shows that sometime ~ 11/14/22 PVC burden dropped down to very minimal PVC burden.  I have scanned in the graph and it is attached today's Clinic note.     Rodri   ----- Message -----  From: Isrrael Martin MD  Sent: 12/20/2022  11:30 AM CST  To: [facility] Arrhythmia Device Staff    Can we get a remote or in person check on her device. Looking to see if her PVC burden (21% in November), has improved on medication. thanks

## 2022-12-29 NOTE — TELEPHONE ENCOUNTER
----- Message from Isrrael Martin MD sent at 12/20/2022  6:33 PM CST -----  Please contact Mrs Booth and let her know that her PVC burden has markedly been reduced with flecainide. We will continue this therapy  ----- Message -----  From: Rodri Aguila  Sent: 12/20/2022   1:24 PM CST  To: Isrrael Martin MD    Hi Dr. Martin,    Pt has a [employer] Device and her remote monitor does nightly readings.  On the transmission that was received last night shows that sometime ~ 11/14/22 PVC burden dropped down to very minimal PVC burden.  I have scanned in the graph and it is attached today's Clinic note.     Rodri   ----- Message -----  From: Isrrael Martin MD  Sent: 12/20/2022  11:30 AM CST  To: [facility] Arrhythmia Device Staff    Can we get a remote or in person check on her device. Looking to see if her PVC burden (21% in November), has improved on medication. thanks

## 2023-03-04 ENCOUNTER — CLINICAL SUPPORT (OUTPATIENT)
Dept: CARDIOLOGY | Facility: HOSPITAL | Age: 68
End: 2023-03-04
Payer: MEDICARE

## 2023-03-04 DIAGNOSIS — I49.5 SICK SINUS SYNDROME: ICD-10-CM

## 2023-03-04 DIAGNOSIS — Z95.0 PRESENCE OF CARDIAC PACEMAKER: ICD-10-CM

## 2023-03-04 PROCEDURE — 93296 REM INTERROG EVL PM/IDS: CPT | Performed by: INTERNAL MEDICINE

## 2023-03-20 DIAGNOSIS — R00.1 BRADYCARDIA: ICD-10-CM

## 2023-03-20 DIAGNOSIS — Z95.0 CARDIAC PACEMAKER IN SITU: Primary | ICD-10-CM

## 2023-03-21 ENCOUNTER — HOSPITAL ENCOUNTER (OUTPATIENT)
Dept: CARDIOLOGY | Facility: CLINIC | Age: 68
Discharge: HOME | End: 2023-03-21
Payer: MEDICARE

## 2023-03-21 ENCOUNTER — OFFICE VISIT (OUTPATIENT)
Dept: ELECTROPHYSIOLOGY | Facility: CLINIC | Age: 68
End: 2023-03-21
Payer: MEDICARE

## 2023-03-21 VITALS
HEART RATE: 82 BPM | SYSTOLIC BLOOD PRESSURE: 120 MMHG | DIASTOLIC BLOOD PRESSURE: 80 MMHG | WEIGHT: 179.88 LBS | BODY MASS INDEX: 28.18 KG/M2

## 2023-03-21 DIAGNOSIS — I48.91 ATRIAL FIBRILLATION AND FLUTTER: Primary | ICD-10-CM

## 2023-03-21 DIAGNOSIS — I48.92 ATRIAL FIBRILLATION AND FLUTTER: Primary | ICD-10-CM

## 2023-03-21 DIAGNOSIS — I49.1 PREMATURE ATRIAL CONTRACTION: ICD-10-CM

## 2023-03-21 DIAGNOSIS — I49.8 OTHER CARDIAC ARRHYTHMIA: ICD-10-CM

## 2023-03-21 DIAGNOSIS — I49.3 PVCS (PREMATURE VENTRICULAR CONTRACTIONS): ICD-10-CM

## 2023-03-21 PROCEDURE — 3079F DIAST BP 80-89 MM HG: CPT | Mod: CPTII,S$GLB,, | Performed by: INTERNAL MEDICINE

## 2023-03-21 PROCEDURE — 99214 OFFICE O/P EST MOD 30 MIN: CPT | Mod: S$GLB,,, | Performed by: INTERNAL MEDICINE

## 2023-03-21 PROCEDURE — 99214 PR OFFICE/OUTPT VISIT, EST, LEVL IV, 30-39 MIN: ICD-10-PCS | Mod: S$GLB,,, | Performed by: INTERNAL MEDICINE

## 2023-03-21 PROCEDURE — 93010 ELECTROCARDIOGRAM REPORT: CPT | Mod: S$GLB,,, | Performed by: INTERNAL MEDICINE

## 2023-03-21 PROCEDURE — 1159F PR MEDICATION LIST DOCUMENTED IN MEDICAL RECORD: ICD-10-PCS | Mod: CPTII,S$GLB,, | Performed by: INTERNAL MEDICINE

## 2023-03-21 PROCEDURE — 93005 RHYTHM STRIP: ICD-10-PCS | Mod: S$GLB,,, | Performed by: INTERNAL MEDICINE

## 2023-03-21 PROCEDURE — 1126F AMNT PAIN NOTED NONE PRSNT: CPT | Mod: CPTII,S$GLB,, | Performed by: INTERNAL MEDICINE

## 2023-03-21 PROCEDURE — 3079F PR MOST RECENT DIASTOLIC BLOOD PRESSURE 80-89 MM HG: ICD-10-PCS | Mod: CPTII,S$GLB,, | Performed by: INTERNAL MEDICINE

## 2023-03-21 PROCEDURE — 1101F PT FALLS ASSESS-DOCD LE1/YR: CPT | Mod: CPTII,S$GLB,, | Performed by: INTERNAL MEDICINE

## 2023-03-21 PROCEDURE — 3074F SYST BP LT 130 MM HG: CPT | Mod: CPTII,S$GLB,, | Performed by: INTERNAL MEDICINE

## 2023-03-21 PROCEDURE — 1101F PR PT FALLS ASSESS DOC 0-1 FALLS W/OUT INJ PAST YR: ICD-10-PCS | Mod: CPTII,S$GLB,, | Performed by: INTERNAL MEDICINE

## 2023-03-21 PROCEDURE — 1126F PR PAIN SEVERITY QUANTIFIED, NO PAIN PRESENT: ICD-10-PCS | Mod: CPTII,S$GLB,, | Performed by: INTERNAL MEDICINE

## 2023-03-21 PROCEDURE — 99999 PR PBB SHADOW E&M-EST. PATIENT-LVL III: ICD-10-PCS | Mod: PBBFAC,,, | Performed by: INTERNAL MEDICINE

## 2023-03-21 PROCEDURE — 3288F PR FALLS RISK ASSESSMENT DOCUMENTED: ICD-10-PCS | Mod: CPTII,S$GLB,, | Performed by: INTERNAL MEDICINE

## 2023-03-21 PROCEDURE — 3288F FALL RISK ASSESSMENT DOCD: CPT | Mod: CPTII,S$GLB,, | Performed by: INTERNAL MEDICINE

## 2023-03-21 PROCEDURE — 93005 ELECTROCARDIOGRAM TRACING: CPT | Mod: S$GLB,,, | Performed by: INTERNAL MEDICINE

## 2023-03-21 PROCEDURE — 93010 RHYTHM STRIP: ICD-10-PCS | Mod: S$GLB,,, | Performed by: INTERNAL MEDICINE

## 2023-03-21 PROCEDURE — 3074F PR MOST RECENT SYSTOLIC BLOOD PRESSURE < 130 MM HG: ICD-10-PCS | Mod: CPTII,S$GLB,, | Performed by: INTERNAL MEDICINE

## 2023-03-21 PROCEDURE — 1159F MED LIST DOCD IN RCRD: CPT | Mod: CPTII,S$GLB,, | Performed by: INTERNAL MEDICINE

## 2023-03-21 PROCEDURE — 3008F PR BODY MASS INDEX (BMI) DOCUMENTED: ICD-10-PCS | Mod: CPTII,S$GLB,, | Performed by: INTERNAL MEDICINE

## 2023-03-21 PROCEDURE — 99999 PR PBB SHADOW E&M-EST. PATIENT-LVL III: CPT | Mod: PBBFAC,,, | Performed by: INTERNAL MEDICINE

## 2023-03-21 PROCEDURE — 3008F BODY MASS INDEX DOCD: CPT | Mod: CPTII,S$GLB,, | Performed by: INTERNAL MEDICINE

## 2023-03-21 NOTE — PROGRESS NOTES
Subjective:    Patient ID:  Lucy Booth is a 67 y.o. female who presents for follow-up of PVCs      67 yoF here for PM management.     3/22: She had Dc PM (Bio) placed for nocturnal AV block 8/21. Per records patient had fatigue. On event monitor had second degree type 2 while sleeping and then during day had pulse oxs rates in 40s. 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 Patient states that she has had chest tightness with any exercise. She attributes change in symptoms to her PPM. Patient also notes this with climbing stairs. She does not have palpitations, does not feel fluttering. States clearly that it is a tightness in the center of her chest associated with exercise.     Metoprolol started for PACs    11/22: Recent dizziness. 21% PVC burden. On metoprolol. Stress test with no ischemia    Flecainide 100 mg bid started    12/22: ECG 12/13/22 normal. Flecainide started. Some headaches, resolved. Chest pressure present. Dizziness has improved.     Interval history: PVC burden 0.6% on flecainide.     Stress echo 3/22:  · The patient exercised for 6 minutes and 15 seconds on a high ramp protocol, corresponding to a functional capacity of 10 METS, achieving a peak heart rate of 136 bpm, which is 91% of the age predicted maximum heart rate.  · The test was stopped because the patient experienced fatigue.  · The patient's exercise capacity was normal.  · During stress, the following significant arrhythmias were observed: occasional PACs.  · The ECG portion of this study is negative for myocardial ischemia.  · The left ventricle is normal in size with normal systolic function. The estimated ejection fraction is 55%. Normal left ventricular diastolic function.  · Normal right ventricular size with normal right ventricular systolic function.  · The estimated PA systolic pressure is 29 mmHg.  · Normal central venous pressure (3 mmHg).  · The stress echo portion of this study is negative for  myocardial ischemia.    Past Medical History:  No date: Abnormal Pap smear  No date: Abnormal Pap smear of cervix  No date: Fibrocystic breast    Past Surgical History:  No date: BLADDER SUSPENSION  No date: COLPOSCOPY  No date: HYSTERECTOMY  08/2021: INSERTION OF PACEMAKER      Comment:  [facility]  No date: LAPAROSCOPIC HYSTERECTOMY      Comment:  [facility]B    Social History    Socioeconomic History      Marital status:     Tobacco Use      Smoking status: Former      Smokeless tobacco: Former    Substance and Sexual Activity      Alcohol use: Yes        Comment: Socially      Drug use: No      Sexual activity: Not Currently        Birth control/protection: Surgical        Comment:       Review of patient's family history indicates:      Current Outpatient Medications:  clobetasol 0.05% (TEMOVATE) 0.05 % Oint, Apply topically 2 (two) times daily. Apply 2 times daily for 1 week then daily for 1 week then twice weekly (Patient not taking: Reported on 11/8/2022), Disp: 45 g, Rfl: 3  estradioL (ESTRACE) 0.01 % (0.1 mg/gram) vaginal cream, Place 1 g vaginally twice a week., Disp: 42.5 g, Rfl: 11  flecainide (TAMBOCOR) 100 MG Tab, Take 1 tablet (100 mg total) by mouth every 12 (twelve) hours., Disp: 60 tablet, Rfl: 11  ONE-A-DAY ESSENTIAL per tablet, Take by mouth., Disp: , Rfl:   rosuvastatin (CRESTOR) 20 MG tablet, Take 1 tablet (20 mg total) by mouth nightly., Disp: 90 tablet, Rfl: 3    No current facility-administered medications for this visit.          Review of Systems   Constitutional: Negative.   HENT: Negative.     Eyes: Negative.    Cardiovascular: Negative.    Respiratory: Negative.     Endocrine: Negative.    Hematologic/Lymphatic: Negative.    Skin: Negative.    Musculoskeletal: Negative.    Gastrointestinal: Negative.    Genitourinary: Negative.    Neurological:  Positive for dizziness and light-headedness.   Psychiatric/Behavioral: Negative.     Allergic/Immunologic: Negative.        Objective:    Physical Exam  Vitals reviewed.   Constitutional:       General: She is not in acute distress.     Appearance: She is well-developed.   HENT:      Head: Normocephalic and atraumatic.   Eyes:      Pupils: Pupils are equal, round, and reactive to light.   Neck:      Thyroid: No thyromegaly.      Vascular: No JVD.   Cardiovascular:      Rate and Rhythm: Normal rate and regular rhythm.      Chest Wall: PMI is not displaced.      Heart sounds: Normal heart sounds, S1 normal and S2 normal. No murmur heard.    No friction rub. No gallop.   Pulmonary:      Effort: Pulmonary effort is normal. No respiratory distress.      Breath sounds: Normal breath sounds. No wheezing or rales.   Abdominal:      General: Bowel sounds are normal. There is no distension.      Palpations: Abdomen is soft.      Tenderness: There is no abdominal tenderness. There is no guarding or rebound.   Musculoskeletal:         General: No tenderness. Normal range of motion.      Cervical back: Normal range of motion.   Skin:     General: Skin is warm and dry.      Findings: No erythema or rash.   Neurological:      Mental Status: She is alert and oriented to person, place, and time.      Cranial Nerves: No cranial nerve deficit.   Psychiatric:         Behavior: Behavior normal.         Thought Content: Thought content normal.         Judgment: Judgment normal.         Assessment:       1. Atrial fibrillation and flutter    2. Premature atrial contraction    3. PVCs (premature ventricular contractions)         Plan:       67 yoF with excellent response to flecainide. PVCs from 21% to 0.6%. Continue current therapy. RTC 6m

## 2023-05-15 ENCOUNTER — OFFICE VISIT (OUTPATIENT)
Dept: DERMATOLOGY | Facility: CLINIC | Age: 68
End: 2023-05-15
Payer: MEDICARE

## 2023-05-15 DIAGNOSIS — L81.4 LENTIGO: ICD-10-CM

## 2023-05-15 DIAGNOSIS — L82.1 SK (SEBORRHEIC KERATOSIS): Primary | ICD-10-CM

## 2023-05-15 DIAGNOSIS — D22.9 MULTIPLE BENIGN NEVI: ICD-10-CM

## 2023-05-15 DIAGNOSIS — Z12.83 SCREENING EXAM FOR SKIN CANCER: ICD-10-CM

## 2023-05-15 PROCEDURE — 99999 PR PBB SHADOW E&M-EST. PATIENT-LVL II: ICD-10-PCS | Mod: PBBFAC,,, | Performed by: DERMATOLOGY

## 2023-05-15 PROCEDURE — 1160F PR REVIEW ALL MEDS BY PRESCRIBER/CLIN PHARMACIST DOCUMENTED: ICD-10-PCS | Mod: CPTII,S$GLB,, | Performed by: DERMATOLOGY

## 2023-05-15 PROCEDURE — 1159F PR MEDICATION LIST DOCUMENTED IN MEDICAL RECORD: ICD-10-PCS | Mod: CPTII,S$GLB,, | Performed by: DERMATOLOGY

## 2023-05-15 PROCEDURE — 1159F MED LIST DOCD IN RCRD: CPT | Mod: CPTII,S$GLB,, | Performed by: DERMATOLOGY

## 2023-05-15 PROCEDURE — 3288F PR FALLS RISK ASSESSMENT DOCUMENTED: ICD-10-PCS | Mod: CPTII,S$GLB,, | Performed by: DERMATOLOGY

## 2023-05-15 PROCEDURE — 99203 OFFICE O/P NEW LOW 30 MIN: CPT | Mod: S$GLB,,, | Performed by: DERMATOLOGY

## 2023-05-15 PROCEDURE — 3288F FALL RISK ASSESSMENT DOCD: CPT | Mod: CPTII,S$GLB,, | Performed by: DERMATOLOGY

## 2023-05-15 PROCEDURE — 99999 PR PBB SHADOW E&M-EST. PATIENT-LVL II: CPT | Mod: PBBFAC,,, | Performed by: DERMATOLOGY

## 2023-05-15 PROCEDURE — 99203 PR OFFICE/OUTPT VISIT, NEW, LEVL III, 30-44 MIN: ICD-10-PCS | Mod: S$GLB,,, | Performed by: DERMATOLOGY

## 2023-05-15 PROCEDURE — 1126F PR PAIN SEVERITY QUANTIFIED, NO PAIN PRESENT: ICD-10-PCS | Mod: CPTII,S$GLB,, | Performed by: DERMATOLOGY

## 2023-05-15 PROCEDURE — 1160F RVW MEDS BY RX/DR IN RCRD: CPT | Mod: CPTII,S$GLB,, | Performed by: DERMATOLOGY

## 2023-05-15 PROCEDURE — 1101F PR PT FALLS ASSESS DOC 0-1 FALLS W/OUT INJ PAST YR: ICD-10-PCS | Mod: CPTII,S$GLB,, | Performed by: DERMATOLOGY

## 2023-05-15 PROCEDURE — 1101F PT FALLS ASSESS-DOCD LE1/YR: CPT | Mod: CPTII,S$GLB,, | Performed by: DERMATOLOGY

## 2023-05-15 PROCEDURE — 1126F AMNT PAIN NOTED NONE PRSNT: CPT | Mod: CPTII,S$GLB,, | Performed by: DERMATOLOGY

## 2023-05-15 NOTE — PROGRESS NOTES
Subjective:      Patient ID:  Lucy Booth is a 67 y.o. female who presents for   Chief Complaint   Patient presents with    Skin Check     Ubse     Patient is here today for a skin check.   Pt has a history of  moderate to severe sun exposure in the past.   Pt recalls several blistering sunburns in the past- none  Pt has history of tanning bed use- no  Pt has had moles removed in the past- none  Pt has history of melanoma in first degree relatives-  no      No h/o nmsc or mm     Last skin exam 2 - 3 years ago    Review of Systems   Skin:  Positive for activity-related sunscreen use and wears hat. Negative for daily sunscreen use and recent sunburn.   Hematologic/Lymphatic: Bruises/bleeds easily (Bruises easily).     Objective:   Physical Exam   Constitutional: She appears well-developed and well-nourished. No distress.   Neurological: She is alert and oriented to person, place, and time. She is not disoriented.   Psychiatric: She has a normal mood and affect.   Skin:   Areas Examined (abnormalities noted in diagram):   Scalp / Hair Palpated and Inspected  Head / Face Inspection Performed  Neck Inspection Performed  Chest / Axilla Inspection Performed  Abdomen Inspection Performed  Genitals / Buttocks / Groin Inspection Performed  Back Inspection Performed  RUE Inspected  LUE Inspection Performed  RLE Inspected  LLE Inspection Performed  Nails and Digits Inspection Performed               Diagram Legend     Erythematous scaling macule/papule c/w actinic keratosis       Vascular papule c/w angioma      Pigmented verrucoid papule/plaque c/w seborrheic keratosis      Yellow umbilicated papule c/w sebaceous hyperplasia      Irregularly shaped tan macule c/w lentigo     1-2 mm smooth white papules consistent with Milia      Movable subcutaneous cyst with punctum c/w epidermal inclusion cyst      Subcutaneous movable cyst c/w pilar cyst      Firm pink to brown papule c/w dermatofibroma      Pedunculated fleshy  papule(s) c/w skin tag(s)      Evenly pigmented macule c/w junctional nevus     Mildly variegated pigmented, slightly irregular-bordered macule c/w mildly atypical nevus      Flesh colored to evenly pigmented papule c/w intradermal nevus       Pink pearly papule/plaque c/w basal cell carcinoma      Erythematous hyperkeratotic cursted plaque c/w SCC      Surgical scar with no sign of skin cancer recurrence      Open and closed comedones      Inflammatory papules and pustules      Verrucoid papule consistent consistent with wart     Erythematous eczematous patches and plaques     Dystrophic onycholytic nail with subungual debris c/w onychomycosis     Umbilicated papule    Erythematous-base heme-crusted tan verrucoid plaque consistent with inflamed seborrheic keratosis     Erythematous Silvery Scaling Plaque c/w Psoriasis     See annotation      Assessment / Plan:        SK (seborrheic keratosis)  These are benign inherited growths without a malignant potential. Reassurance given to patient. No treatment is necessary.      Lentigo  This is a benign hyperpigmented sun induced lesion. Recommend daily sun protection/avoidance and use of at least SPF 30, broad spectrum sunscreen (OTC drug) will reduce the number of new lesions. Treatment of these benign lesions are considered cosmetic.      Multiple benign nevi   - minor problem and chronic.   Reassurance given to patient. No treatment necessary.       Screening exam for skin cancer  Total body skin examination performed today including at least 12 points as noted in physical examination. No lesions suspicious for malignancy noted.    Recommend daily sun protection/avoidance, use of at least SPF 30, broad spectrum sunscreen (OTC drug), skin self examinations, and routine physician surveillance to optimize early detection               No follow-ups on file.

## 2023-06-02 ENCOUNTER — CLINICAL SUPPORT (OUTPATIENT)
Dept: CARDIOLOGY | Facility: HOSPITAL | Age: 68
End: 2023-06-02
Payer: MEDICARE

## 2023-06-02 DIAGNOSIS — Z95.0 PRESENCE OF CARDIAC PACEMAKER: ICD-10-CM

## 2023-06-02 DIAGNOSIS — I49.8 OTHER SPECIFIED CARDIAC ARRHYTHMIAS: ICD-10-CM

## 2023-06-02 PROCEDURE — 93296 REM INTERROG EVL PM/IDS: CPT | Performed by: INTERNAL MEDICINE

## 2023-06-02 PROCEDURE — 93294 CARDIAC DEVICE CHECK - REMOTE: ICD-10-PCS | Mod: ,,, | Performed by: INTERNAL MEDICINE

## 2023-06-02 PROCEDURE — 93294 REM INTERROG EVL PM/LDLS PM: CPT | Mod: ,,, | Performed by: INTERNAL MEDICINE

## 2023-07-31 ENCOUNTER — TELEPHONE (OUTPATIENT)
Dept: OBSTETRICS AND GYNECOLOGY | Facility: CLINIC | Age: 68
End: 2023-07-31
Payer: MEDICARE

## 2023-07-31 DIAGNOSIS — Z13.820 SCREENING FOR OSTEOPOROSIS: ICD-10-CM

## 2023-07-31 DIAGNOSIS — Z12.31 ENCOUNTER FOR SCREENING MAMMOGRAM FOR MALIGNANT NEOPLASM OF BREAST: Primary | ICD-10-CM

## 2023-07-31 DIAGNOSIS — Z78.0 POST-MENOPAUSAL: ICD-10-CM

## 2023-07-31 NOTE — TELEPHONE ENCOUNTER
----- Message from Elisabeth Lucio MA sent at 7/31/2023 11:43 AM CDT -----  Pt needs an annual scheduled for November and she also needs orders placed for a mammogram and bone scan.  # [phone]

## 2023-07-31 NOTE — TELEPHONE ENCOUNTER
Pt called to schedule annual, mmg and bone density. Advised pt she has medicare and they only pay for annual every 2 years. Ordered pts mmg and BMD. Pt scheduled

## 2023-08-30 RX ORDER — ROSUVASTATIN CALCIUM 20 MG/1
20 TABLET, COATED ORAL NIGHTLY
Qty: 90 TABLET | Refills: 3 | Status: SHIPPED | OUTPATIENT
Start: 2023-08-30 | End: 2023-10-13

## 2023-08-31 ENCOUNTER — CLINICAL SUPPORT (OUTPATIENT)
Dept: CARDIOLOGY | Facility: HOSPITAL | Age: 68
End: 2023-08-31
Payer: MEDICARE

## 2023-08-31 DIAGNOSIS — Z95.0 PRESENCE OF CARDIAC PACEMAKER: ICD-10-CM

## 2023-08-31 PROCEDURE — 93296 REM INTERROG EVL PM/IDS: CPT | Performed by: INTERNAL MEDICINE

## 2023-10-12 DIAGNOSIS — I49.1 PREMATURE ATRIAL COMPLEXES: ICD-10-CM

## 2023-10-13 RX ORDER — FLECAINIDE ACETATE 100 MG/1
100 TABLET ORAL EVERY 12 HOURS
Qty: 180 TABLET | Refills: 3 | Status: SHIPPED | OUTPATIENT
Start: 2023-10-13

## 2023-10-13 RX ORDER — ROSUVASTATIN CALCIUM 20 MG/1
20 TABLET, COATED ORAL DAILY
Qty: 90 TABLET | Refills: 3 | Status: SHIPPED | OUTPATIENT
Start: 2023-10-13

## 2023-11-08 RX ORDER — CLOBETASOL PROPIONATE 0.5 MG/G
OINTMENT TOPICAL 2 TIMES DAILY
Qty: 45 G | Refills: 0 | Status: SHIPPED | OUTPATIENT
Start: 2023-11-08

## 2023-11-09 NOTE — TELEPHONE ENCOUNTER
Refill Decision Note   Lucy Booth  is requesting a refill authorization.  Brief Assessment and Rationale for Refill:  Approve     Medication Therapy Plan:       Medication Reconciliation Completed: No   Comments:     No Care Gaps recommended.     Note composed:9:54 PM 11/08/2023

## 2023-11-29 ENCOUNTER — CLINICAL SUPPORT (OUTPATIENT)
Dept: CARDIOLOGY | Facility: HOSPITAL | Age: 68
End: 2023-11-29
Payer: MEDICARE

## 2023-11-29 ENCOUNTER — CLINICAL SUPPORT (OUTPATIENT)
Dept: CARDIOLOGY | Facility: HOSPITAL | Age: 68
End: 2023-11-29
Attending: INTERNAL MEDICINE
Payer: MEDICARE

## 2023-11-29 DIAGNOSIS — Z95.0 PRESENCE OF CARDIAC PACEMAKER: ICD-10-CM

## 2023-11-29 PROCEDURE — 93296 REM INTERROG EVL PM/IDS: CPT | Performed by: INTERNAL MEDICINE

## 2023-11-29 PROCEDURE — 93294 REM INTERROG EVL PM/LDLS PM: CPT | Mod: ,,, | Performed by: INTERNAL MEDICINE

## 2023-11-29 PROCEDURE — 93294 CARDIAC DEVICE CHECK - REMOTE: ICD-10-PCS | Mod: ,,, | Performed by: INTERNAL MEDICINE

## 2023-11-30 ENCOUNTER — HOSPITAL ENCOUNTER (OUTPATIENT)
Dept: RADIOLOGY | Facility: OTHER | Age: 68
Discharge: HOME | End: 2023-11-30
Attending: OBSTETRICS & GYNECOLOGY
Payer: MEDICARE

## 2023-11-30 DIAGNOSIS — Z12.31 ENCOUNTER FOR SCREENING MAMMOGRAM FOR MALIGNANT NEOPLASM OF BREAST: ICD-10-CM

## 2023-11-30 DIAGNOSIS — Z78.0 POST-MENOPAUSAL: ICD-10-CM

## 2023-11-30 LAB
OHS CV AF BURDEN PERCENT: < 1
OHS CV DC REMOTE DEVICE TYPE: NORMAL
OHS CV RV PACING PERCENT: 3 %

## 2023-11-30 PROCEDURE — 77063 BREAST TOMOSYNTHESIS BI: CPT | Mod: 26,,, | Performed by: RADIOLOGY

## 2023-11-30 PROCEDURE — 77080 DXA BONE DENSITY AXIAL SKELETON 1 OR MORE SITES: ICD-10-PCS | Mod: 26,,, | Performed by: RADIOLOGY

## 2023-11-30 PROCEDURE — 77067 SCR MAMMO BI INCL CAD: CPT | Mod: 26,,, | Performed by: RADIOLOGY

## 2023-11-30 PROCEDURE — 77080 DXA BONE DENSITY AXIAL: CPT | Mod: TC

## 2023-11-30 PROCEDURE — 77067 MAMMO DIGITAL SCREENING BILAT WITH TOMO: ICD-10-PCS | Mod: 26,,, | Performed by: RADIOLOGY

## 2023-11-30 PROCEDURE — 77063 MAMMO DIGITAL SCREENING BILAT WITH TOMO: ICD-10-PCS | Mod: 26,,, | Performed by: RADIOLOGY

## 2023-11-30 PROCEDURE — 77067 SCR MAMMO BI INCL CAD: CPT | Mod: TC

## 2023-11-30 PROCEDURE — 77080 DXA BONE DENSITY AXIAL: CPT | Mod: 26,,, | Performed by: RADIOLOGY

## 2024-02-08 ENCOUNTER — HOSPITAL ENCOUNTER (EMERGENCY)
Facility: OTHER | Age: 69
Discharge: HOME | End: 2024-02-08
Attending: EMERGENCY MEDICINE
Payer: MEDICARE

## 2024-02-08 VITALS
HEART RATE: 92 BPM | TEMPERATURE: 98 F | OXYGEN SATURATION: 97 % | BODY MASS INDEX: 26.68 KG/M2 | RESPIRATION RATE: 16 BRPM | HEIGHT: 67 IN | SYSTOLIC BLOOD PRESSURE: 126 MMHG | DIASTOLIC BLOOD PRESSURE: 79 MMHG | WEIGHT: 170 LBS

## 2024-02-08 DIAGNOSIS — S52.502A CLOSED FRACTURE OF DISTAL END OF LEFT RADIUS, UNSPECIFIED FRACTURE MORPHOLOGY, INITIAL ENCOUNTER: Primary | ICD-10-CM

## 2024-02-08 DIAGNOSIS — S69.92XA LEFT WRIST INJURY, INITIAL ENCOUNTER: ICD-10-CM

## 2024-02-08 PROCEDURE — 25605 CLTX DST RDL FX/EPHYS SEP W/: CPT | Mod: LT

## 2024-02-08 PROCEDURE — 25000003 PHARM REV CODE 250: Performed by: PHYSICIAN ASSISTANT

## 2024-02-08 PROCEDURE — 99285 EMERGENCY DEPT VISIT HI MDM: CPT | Mod: 25

## 2024-02-08 PROCEDURE — 25000003 PHARM REV CODE 250: Performed by: EMERGENCY MEDICINE

## 2024-02-08 RX ORDER — HYDROCODONE BITARTRATE AND ACETAMINOPHEN 5; 325 MG/1; MG/1
1 TABLET ORAL EVERY 4 HOURS PRN
Qty: 12 TABLET | Refills: 0 | Status: SHIPPED | OUTPATIENT
Start: 2024-02-08 | End: 2024-02-09 | Stop reason: SDUPTHER

## 2024-02-08 RX ORDER — LIDOCAINE HYDROCHLORIDE 10 MG/ML
5 INJECTION INFILTRATION; PERINEURAL
Status: COMPLETED | OUTPATIENT
Start: 2024-02-08 | End: 2024-02-08

## 2024-02-08 RX ORDER — LIDOCAINE HYDROCHLORIDE 10 MG/ML
5 INJECTION INFILTRATION; PERINEURAL
Status: DISCONTINUED | OUTPATIENT
Start: 2024-02-08 | End: 2024-02-08 | Stop reason: HOSPADM

## 2024-02-08 RX ORDER — HYDROCODONE BITARTRATE AND ACETAMINOPHEN 5; 325 MG/1; MG/1
1 TABLET ORAL
Status: COMPLETED | OUTPATIENT
Start: 2024-02-08 | End: 2024-02-08

## 2024-02-08 RX ADMIN — HYDROCODONE BITARTRATE AND ACETAMINOPHEN 1 TABLET: 5; 325 TABLET ORAL at 04:02

## 2024-02-08 RX ADMIN — LIDOCAINE HYDROCHLORIDE 5 ML: 10 INJECTION, SOLUTION INFILTRATION; PERINEURAL at 04:02

## 2024-02-08 NOTE — FIRST PROVIDER EVALUATION
Emergency Department TeleTriage Encounter Note      CHIEF COMPLAINT    Chief Complaint   Patient presents with    Wrist Injury     Pain and swelling to L wrist after tripping and catching self with L arm PTA. + deformity and + swelling. Arm in sling from home.       VITAL SIGNS   Initial Vitals [02/08/24 1522]   BP Pulse Resp Temp SpO2   126/79 92 16 97.6 °F (36.4 °C) 97 %      MAP       --            ALLERGIES    Review of patient's allergies indicates:  No Known Allergies    PROVIDER TRIAGE NOTE  Patient presents with pain and swelling in the left wrist secondary to foosh. Obvious deformity per patient.       ORDERS  Labs Reviewed - No data to display    ED Orders (720h ago, onward)      None              Virtual Visit Note: The provider triage portion of this emergency department evaluation and documentation was performed via [employer], a HIPAA-compliant telemedicine application, in concert with a tele-presenter in the room. A face to face patient evaluation with one of my colleagues will occur once the patient is placed in an emergency department room.      DISCLAIMER: This note was prepared with [employer] voice recognition transcription software. Garbled syntax, mangled pronouns, and other bizarre constructions may be attributed to that software system.

## 2024-02-08 NOTE — ED PROVIDER NOTES
Encounter Date: 2/8/2024    SCRIBE #1 NOTE: I, Efrem Palmer, am scribing for, and in the presence of,  Rosie Rothman MD.       History     Chief Complaint   Patient presents with    Wrist Injury     Pain and swelling to L wrist after tripping and catching self with L arm PTA. + deformity and + swelling. Arm in sling from home.     Time seen by provider: 4:22 PM    Lucy Booth is a 68 y.o. 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 The patient was running when a toddler stepped in front of her and when trying to avoid the child, she slipped, landing onto her left side with her arm extended out. Patient notes associated swelling to her wrist but denies any open wounds, back pain, or neck pain.  Denies any head injury or LOC.  She denies any food intake today excluding cottage cheese at 1000. This is the extent of the patient's complaints today in the Emergency Department.      The history is provided by the patient.     Review of patient's allergies indicates:  No Known Allergies  Past Medical History:   Diagnosis Date    Abnormal Pap smear     Abnormal Pap smear of cervix     Fibrocystic breast      Past Surgical History:   Procedure Laterality Date    BLADDER SUSPENSION      COLPOSCOPY      HYSTERECTOMY      INSERTION OF PACEMAKER  08/2021    [facility]    LAPAROSCOPIC HYSTERECTOMY      AUB     Family History   Problem Relation Age of Onset    Breast cancer Paternal Grandmother     Heart disease Mother     Colon cancer Neg Hx     Ovarian cancer Neg Hx     Cancer Neg Hx      Social History     Tobacco Use    Smoking status: Former    Smokeless tobacco: Former   Substance Use Topics    Alcohol use: Yes     Comment: Socially    Drug use: No     Review of Systems   Constitutional:  Negative for chills and fever.   Musculoskeletal:  Positive for joint swelling. Negative for back pain and neck  pain.   Skin:  Negative for color change, rash and wound.   Neurological:  Negative for dizziness and headaches.       Physical Exam     Initial Vitals [02/08/24 1522]   BP Pulse Resp Temp SpO2   126/79 92 16 97.6 °F (36.4 °C) 97 %      MAP       --         Physical Exam    Nursing note and vitals reviewed.  Constitutional: She appears well-developed and well-nourished.   HENT:   Head: Normocephalic and atraumatic.   Eyes: Conjunctivae are normal.   Cardiovascular:            2+ radial pulses.    Pulmonary/Chest: No respiratory distress.   Musculoskeletal:         General: Tenderness present. Normal range of motion.      Comments: Swelling and deformity to the left wrist with associated tenderness.  Limited range of motion secondary to pain.  Sensation intact with good capillary refill.     Neurological: She is alert and oriented to person, place, and time.   Skin: Skin is warm and dry. Capillary refill takes less than 2 seconds.         ED Course   Orthopedic Injury    Date/Time: 2/8/2024 7:08 PM    Performed by: Rosie Rothman MD  Authorized by: Rosie Rothman MD    Location procedure was performed:  [facility] EMERGENCY DEPARTMENT  Consent Done?:  Yes  Universal Protocol:     Verbal consent obtained?: Yes      Risks and benefits: Risks, benefits and alternatives were discussed      Consent given by:  Patient    Patient states understanding of procedure being performed: Yes      Patient's understanding of procedure matches consent: Yes      Procedure consent matches procedure scheduled: Yes      Relevant documents present and verified: Yes      Test results available and properly labeled: Yes      Imaging studies available: Yes      Patient identity confirmed:  MRN and verbally with patient  Injury:     Injury location:  Wrist    Location details:  Left wrist    Injury type:  Fracture    Fracture type: distal radius      Fracture type: distal radius        Pre-procedure assessment:     Local anesthesia used?: Yes       Anesthesia:  Hematoma block    Local anesthetic:  Lidocaine 1% without epinephrine    Anesthetic total (ml):  5      Selections made in this section will also lock the Injury type section above.:     Manipulation performed?: Yes      Skeletal traction used?: Yes      Immobilization:  Splint    Splint type:  Sugar tong    Complications: No      Specimens: No    Post-procedure assessment:     Neurovascular status: Neurovascularly intact      Distal perfusion: normal      Neurological function: normal      Patient tolerance:  Patient tolerated the procedure well with no immediate complications    Labs Reviewed - No data to display       Imaging Results              X-Ray Wrist 2 View Left (Final result)  Result time 02/08/24 19:13:38      Final result by Ruba Holly MD (02/08/24 19:13:38)                   Impression:      See above.      Electronically signed by: Ruba Holly MD  Date:    02/08/2024  Time:    19:13               Narrative:    EXAMINATION:  XR WRIST 2 VIEW LEFT    CLINICAL HISTORY:  wrist fracutre;    TECHNIQUE:  Left wrist two views.    COMPARISON:  16:11.    FINDINGS:  Redemonstration of acute distal radius and ulnar styloid process fractures.  There is persistent displacement and impaction seen of the distal radius fracture, however alignment and angulation appear overall improved from earlier exam.                                       X-Ray Wrist Complete Left (Final result)  Result time 02/08/24 17:09:05      Final result by Ruba Holly MD (02/08/24 17:09:05)                   Impression:      Acute distal radius comminuted impacted fracture and ulnar styloid process fracture.      Electronically signed by: Ruba Holly MD  Date:    02/08/2024  Time:    17:09               Narrative:    EXAMINATION:  XR WRIST COMPLETE 3 VIEWS LEFT    CLINICAL HISTORY:  Unspecified injury of left wrist, hand and finger(s), initial encounter    TECHNIQUE:  PA, lateral, and oblique views of the  left wrist were performed.    COMPARISON:  None    FINDINGS:  Acute comminuted impacted fracture is seen of the distal radius.  Fracture planes extend to the radial articular surface.  Additional acute displaced fracture is seen of the ulnar styloid process.  No additional acute fractures or dislocation seen.  Pronounced soft tissue is seen at the posterior aspect of the wrist and distal forearm.                                    X-Rays:   Independently Interpreted Readings:   Other Readings:  Left Wrist X-Ray: Impacted distal radial fracture with associated ulnar styloid fracture.    Medications   LIDOcaine HCL 10 mg/ml (1%) injection 5 mL (has no administration in time range)   HYDROcodone-acetaminophen 5-325 mg per tablet 1 tablet (1 tablet Oral Given 2/8/24 1604)   LIDOcaine HCL 10 mg/ml (1%) injection 5 mL (5 mLs Infiltration Given 2/8/24 7226)     Medical Decision Making  4:22PM:  Patient is a 68-year-old female who presents to the emergency department after a wrist injury.  Patient does have swelling and an obvious deformity.  Will plan for x-ray, analgesia, will continue to follow and reassess.    Amount and/or Complexity of Data Reviewed  Radiology: ordered and independent interpretation performed. Decision-making details documented in ED Course.    Risk  Prescription drug management.    4:35PM:  Patient's x-ray does show an impacted distal radial fracture with displacement.  Will plan for a hematoma block with skeletal traction with finger traps.  I updated the patient regarding the results along with plan.  Will continue to follow and reassess.    6:58PM:  Patient doing well and tolerated hematoma block with good anesthetic result.  We were able to obtain improved alignment from the fracture and patient was placed in a sugar-tong splint which she tolerated well.  She remains neurovascularly intact.  Will have her follow up with Orthopedics.  I do not feel that further work up in the ED is indicated at  this time.  I updated pt regarding results and I counseled pt regarding supportive care measures.  I have discussed with the pt ED return warnings and need for close PCP f/u.  Pt agreeable to plan and all questions answered.  I feel that pt is stable for discharge and management as an outpatient and no further intervention is needed at this time.  Pt is comfortable returning to the ED if needed.  Will DC home in stable condition.          Scribe Attestation:   Scribe #1: I performed the above scribed service and the documentation accurately describes the services I performed. I attest to the accuracy of the note.    Physician Attestation for Scribe: I, Rosie Rothman, reviewed documentation as scribed in my presence, which is both accurate and complete.                              Clinical Impression:  Final diagnoses:  [S69.92XA] Left wrist injury, initial encounter  [S52.502A] Closed fracture of distal end of left radius, unspecified fracture morphology, initial encounter (Primary)          ED Disposition Condition    Discharge           ED Prescriptions       Medication Sig Dispense Start Date End Date Auth. Provider    HYDROcodone-acetaminophen (NORCO) 5-325 mg per tablet Take 1 tablet by mouth every 4 (four) hours as needed for Pain. 12 tablet 2/8/2024 -- Rosie Rothman MD          Follow-up Information       Follow up With Specialties Details Why Contact Info    [facility]   [address]  [facility] [address]  [phone]      Amol León MD Orthopedic Surgery   [address]  [address]  [place] [address]  [phone]               Rosie Rothman MD  02/08/24 2051

## 2024-02-09 ENCOUNTER — TELEPHONE (OUTPATIENT)
Dept: ELECTROPHYSIOLOGY | Facility: CLINIC | Age: 69
End: 2024-02-09
Payer: MEDICARE

## 2024-02-09 ENCOUNTER — TELEPHONE (OUTPATIENT)
Dept: ORTHOPEDICS | Facility: CLINIC | Age: 69
End: 2024-02-09
Payer: MEDICARE

## 2024-02-09 DIAGNOSIS — S52.502A CLOSED FRACTURE OF DISTAL END OF LEFT RADIUS, UNSPECIFIED FRACTURE MORPHOLOGY, INITIAL ENCOUNTER: ICD-10-CM

## 2024-02-09 RX ORDER — HYDROCODONE BITARTRATE AND ACETAMINOPHEN 5; 325 MG/1; MG/1
1 TABLET ORAL EVERY 4 HOURS PRN
Qty: 12 TABLET | Refills: 0 | Status: SHIPPED | OUTPATIENT
Start: 2024-02-09

## 2024-02-09 NOTE — TELEPHONE ENCOUNTER
Returned call from patient's wife, Urvashi. Patient has decided to start 400 mg Amiodarone daily per Dr Meredith previous recommendation. Verified preferred pharmacy and reviewed medication instructions with patient's wife. She verbalized understanding and thanked me for the call.

## 2024-02-09 NOTE — TELEPHONE ENCOUNTER
Patient has been identified as having an implanted cardiac rhythm device (CRD); the implanted device is a Biotronik pacemaker    The reported surgical procedure is above the umbilicus.  Per protocol, apply a magnet directly over the implanted device during entire surgical procedure if electrocautery will be used.      For additional questions, please contact the Arrhythmia Department at Ext [phone].      If reprogramming of device is needed, please contact [facility]  at [phone].

## 2024-02-15 ENCOUNTER — TELEPHONE (OUTPATIENT)
Dept: ELECTROPHYSIOLOGY | Facility: CLINIC | Age: 69
End: 2024-02-15
Payer: MEDICARE

## 2024-02-15 NOTE — TELEPHONE ENCOUNTER
----- Message from Greg Floyd MA sent at 2/14/2024  4:18 PM CST -----  Contact: self    ----- Message -----  From: Kimberly Yeager MA  Sent: 2/14/2024  11:53 AM CST  To: [facility] Staff    Pt is calling to get a note from  but needs these words saying( (ok she is cleared to have the wrist surgery). Please call  [phone].

## 2024-02-28 ENCOUNTER — CLINICAL SUPPORT (OUTPATIENT)
Dept: CARDIOLOGY | Facility: HOSPITAL | Age: 69
End: 2024-02-28
Attending: INTERNAL MEDICINE
Payer: MEDICARE

## 2024-02-28 ENCOUNTER — CLINICAL SUPPORT (OUTPATIENT)
Dept: CARDIOLOGY | Facility: HOSPITAL | Age: 69
End: 2024-02-28
Payer: MEDICARE

## 2024-02-28 DIAGNOSIS — Z95.0 PRESENCE OF CARDIAC PACEMAKER: ICD-10-CM

## 2024-02-28 PROCEDURE — 93296 REM INTERROG EVL PM/IDS: CPT | Performed by: INTERNAL MEDICINE

## 2024-02-28 PROCEDURE — 93294 REM INTERROG EVL PM/LDLS PM: CPT | Mod: ,,, | Performed by: INTERNAL MEDICINE

## 2024-03-15 LAB
OHS CV AF BURDEN PERCENT: < 1
OHS CV DC REMOTE DEVICE TYPE: NORMAL
OHS CV RV PACING PERCENT: 2 %

## 2024-03-25 ENCOUNTER — TELEPHONE (OUTPATIENT)
Dept: ELECTROPHYSIOLOGY | Facility: CLINIC | Age: 69
End: 2024-03-25
Payer: MEDICARE

## 2024-03-25 NOTE — TELEPHONE ENCOUNTER
----- Message from Shayy Gandhi sent at 3/25/2024 10:15 AM CDT -----  Regarding: Pain  Yesterday patient was having pain shooting up to her neck and shoulder. Please call back @ [phone]. Thank you Shayy

## 2024-07-23 ENCOUNTER — PATIENT MESSAGE (OUTPATIENT)
Dept: OBSTETRICS AND GYNECOLOGY | Facility: CLINIC | Age: 69
End: 2024-07-23
Payer: MEDICARE

## 2024-07-30 NOTE — TELEPHONE ENCOUNTER
Faxed screening mammogram order to [facility] at  per pt request. Pt notified and voiced understanding.  
Show Aperture Variable?: Yes
Consent: The patient's consent was obtained including but not limited to risks of crusting, scabbing, blistering, scarring, darker or lighter pigmentary change, recurrence, incomplete removal and infection.
Render Post-Care Instructions In Note?: no
Detail Level: Detailed
Duration Of Freeze Thaw-Cycle (Seconds): 0
Post-Care Instructions: I reviewed with the patient in detail post-care instructions. Patient is to wear sunprotection, and avoid picking at any of the treated lesions. Pt may apply Vaseline to crusted or scabbing areas.

## 2025-03-27 ENCOUNTER — TELEPHONE (OUTPATIENT)
Dept: ELECTROPHYSIOLOGY | Facility: CLINIC | Age: 70
End: 2025-03-27
Payer: MEDICARE

## 2025-03-27 NOTE — TELEPHONE ENCOUNTER
S/w patient by phone today re: deactivated Biotronik RHM  Pt confirms she is currently being followed by Dr. Cristopher Messina    Will bulmaro inactive in [place]